# Patient Record
Sex: MALE | Race: BLACK OR AFRICAN AMERICAN | Employment: OTHER | URBAN - METROPOLITAN AREA
[De-identification: names, ages, dates, MRNs, and addresses within clinical notes are randomized per-mention and may not be internally consistent; named-entity substitution may affect disease eponyms.]

---

## 2019-01-02 NOTE — IP AVS SNAPSHOT
[facility]            (For Outpatient Use Only) Initial Admit Date: 2022   Inpt/Obs Admit Date: Inpt: 22 / Obs: N/A   Discharge Date:    Hospital Acct:  [de-identified]   MRN: [de-identified]   CSN: [id number]   CEID: [id number]        ENCOUNTER  Patient Class: Inpatient Admitting Provider: Ronnell Slater MD Unit: [address]//SE   Hospital Service: Ortho/Spine Attending Provider: Sharona Braswell MD   Bed: 456-A   Visit Type:   Referring Physician: No ref. provider found Billing Flag:    Admit Diagnosis: Small bowel obstruction [facility]) [M53.561]      PATIENT  Legal Name:   Radu Brennan    Legal Sex: Male  Gender ID:              Pref Name:    PCP:  Riki Peng MD Home: [phone]   Address:  [facility] : 1940 (81 yrs) Mobile: [phone]         City/State/Zip: [place] [address] Marital:  Language: Jaya AlejandroMinefrain Friday SSN4: [id number] Rastafarian: [address] Not [place]*     Race: Black Or  Ethnicity: Non  Or [address]   Name Relationship Legal Guardian? Home Phone Work Phone Mobile Phone   1. yordan hancock  2.  Jaki Mates Daughter  Spouse      [phone]       GUARANTOR  Guarantor: Kirby Peter : 1940 Home Phone: [phone]   Address: [facility]  Sex: Male Work Phone:    City/State/Zip: [place], [address]   Rel. to Patient: Self Guarantor ID: [id number]   GUARANTOR EMPLOYER   Employer:  Status: RETIRED     COVERAGE  PRIMARY INSURANCE   Payor: BLUE CROSS [facility] Plan: BCBS MEDICARE ADV PPO   Group Number: [id number] Insurance Type: [address] Name: Remberto Wilhelm : 1940   Subscriber ID: [id number] Pt Rel to Subscriber: Self   SECONDARY INSURANCE   Payor:  Plan:    Group Number:  Insurance Type:    Subscriber Name:  Subscriber :    Subscriber ID:  Pt Rel to Subscriber:    TERTIARY INSURANCE   Payor:  Plan:    Group Number:  Insurance Type: Subscriber Name:  Levi Guzman :    Subscriber ID:  Pt Rel to Subscriber:    Hospital Account Financial Class: Medicare Advantage    2022 Consent (Marginal Mandibular)/Introductory Paragraph: The rationale for Mohs was explained to the patient and consent was obtained. The risks, benefits and alternatives to therapy were discussed in detail. Specifically, the risks of damage to the marginal mandibular branch of the facial nerve, infection, scarring, bleeding, prolonged wound healing, incomplete removal, allergy to anesthesia, and recurrence were addressed. Prior to the procedure, the treatment site was clearly identified and confirmed by the patient. All components of Universal Protocol/PAUSE Rule completed.

## 2020-02-29 ENCOUNTER — APPOINTMENT (OUTPATIENT)
Dept: GENERAL RADIOLOGY | Facility: HOSPITAL | Age: 80
DRG: 310 | End: 2020-02-29
Payer: MEDICARE

## 2020-02-29 ENCOUNTER — HOSPITAL ENCOUNTER (INPATIENT)
Facility: HOSPITAL | Age: 80
LOS: 2 days | Discharge: HOME | DRG: 310 | End: 2020-03-02
Attending: EMERGENCY MEDICINE | Admitting: HOSPITALIST
Payer: MEDICARE

## 2020-02-29 ENCOUNTER — APPOINTMENT (OUTPATIENT)
Dept: CT IMAGING | Facility: HOSPITAL | Age: 80
DRG: 310 | End: 2020-02-29
Attending: HOSPITALIST
Payer: MEDICARE

## 2020-02-29 DIAGNOSIS — R00.1 BRADYCARDIA: ICD-10-CM

## 2020-02-29 DIAGNOSIS — R55 SYNCOPE AND COLLAPSE: Primary | ICD-10-CM

## 2020-02-29 LAB
ANION GAP SERPL CALC-SCNC: 4 MMOL/L (ref 0–18)
BASOPHILS # BLD AUTO: 0.04 X10(3) UL (ref 0–0.2)
BASOPHILS NFR BLD AUTO: 0.5 %
BILIRUB UR QL: NEGATIVE
BUN BLD-MCNC: 13 MG/DL (ref 7–18)
BUN/CREAT SERPL: 9.1 (ref 10–20)
CALCIUM BLD-MCNC: 8.6 MG/DL (ref 8.5–10.1)
CHLORIDE SERPL-SCNC: 108 MMOL/L (ref 98–112)
CLARITY UR: CLEAR
CO2 SERPL-SCNC: 28 MMOL/L (ref 21–32)
COLOR UR: YELLOW
CREAT BLD-MCNC: 1.43 MG/DL (ref 0.7–1.3)
DEPRECATED RDW RBC AUTO: 47.9 FL (ref 35.1–46.3)
EOSINOPHIL # BLD AUTO: 0.52 X10(3) UL (ref 0–0.7)
EOSINOPHIL NFR BLD AUTO: 6.4 %
ERYTHROCYTE [DISTWIDTH] IN BLOOD BY AUTOMATED COUNT: 13.2 % (ref 11–15)
GLUCOSE BLD-MCNC: 84 MG/DL (ref 70–99)
GLUCOSE BLDC GLUCOMTR-MCNC: 89 MG/DL (ref 70–99)
GLUCOSE UR-MCNC: NEGATIVE MG/DL
HAV IGM SER QL: 2.2 MG/DL (ref 1.6–2.6)
HCT VFR BLD AUTO: 42.9 % (ref 39–53)
HGB BLD-MCNC: 14 G/DL (ref 13–17.5)
HGB UR QL STRIP.AUTO: NEGATIVE
IMM GRANULOCYTES # BLD AUTO: 0.04 X10(3) UL (ref 0–1)
IMM GRANULOCYTES NFR BLD: 0.5 %
KETONES UR-MCNC: NEGATIVE MG/DL
LEUKOCYTE ESTERASE UR QL STRIP.AUTO: NEGATIVE
LYMPHOCYTES # BLD AUTO: 3.89 X10(3) UL (ref 1–4)
LYMPHOCYTES NFR BLD AUTO: 47.7 %
MCH RBC QN AUTO: 32.2 PG (ref 26–34)
MCHC RBC AUTO-ENTMCNC: 32.6 G/DL (ref 31–37)
MCV RBC AUTO: 98.6 FL (ref 80–100)
MONOCYTES # BLD AUTO: 0.54 X10(3) UL (ref 0.1–1)
MONOCYTES NFR BLD AUTO: 6.6 %
NEUTROPHILS # BLD AUTO: 3.13 X10 (3) UL (ref 1.5–7.7)
NEUTROPHILS # BLD AUTO: 3.13 X10(3) UL (ref 1.5–7.7)
NEUTROPHILS NFR BLD AUTO: 38.3 %
NITRITE UR QL STRIP.AUTO: NEGATIVE
OSMOLALITY SERPL CALC.SUM OF ELEC: 289 MOSM/KG (ref 275–295)
PH UR: 7 [PH] (ref 5–8)
PLATELET # BLD AUTO: 161 10(3)UL (ref 150–450)
POTASSIUM SERPL-SCNC: 4.6 MMOL/L (ref 3.5–5.1)
PROT UR-MCNC: NEGATIVE MG/DL
RBC # BLD AUTO: 4.35 X10(6)UL (ref 3.8–5.8)
SODIUM SERPL-SCNC: 140 MMOL/L (ref 136–145)
SP GR UR STRIP: 1 (ref 1–1.03)
TROPONIN I SERPL-MCNC: <0.045 NG/ML (ref ?–0.04)
TROPONIN I SERPL-MCNC: <0.045 NG/ML (ref ?–0.04)
UROBILINOGEN UR STRIP-ACNC: <2
WBC # BLD AUTO: 8.2 X10(3) UL (ref 4–11)

## 2020-02-29 PROCEDURE — 71045 X-RAY EXAM CHEST 1 VIEW: CPT | Performed by: EMERGENCY MEDICINE

## 2020-02-29 PROCEDURE — 70450 CT HEAD/BRAIN W/O DYE: CPT | Performed by: HOSPITALIST

## 2020-02-29 PROCEDURE — 99223 1ST HOSP IP/OBS HIGH 75: CPT | Performed by: OTHER

## 2020-02-29 PROCEDURE — 99222 1ST HOSP IP/OBS MODERATE 55: CPT | Performed by: HOSPITALIST

## 2020-02-29 RX ORDER — MEMANTINE HYDROCHLORIDE 5 MG/1
5 TABLET ORAL 2 TIMES DAILY
Status: DISCONTINUED | OUTPATIENT
Start: 2020-02-29 | End: 2020-03-02

## 2020-02-29 RX ORDER — BISACODYL 10 MG
10 SUPPOSITORY, RECTAL RECTAL
Status: DISCONTINUED | OUTPATIENT
Start: 2020-02-29 | End: 2020-03-02

## 2020-02-29 RX ORDER — LOSARTAN POTASSIUM 50 MG/1
50 TABLET ORAL 2 TIMES DAILY
COMMUNITY

## 2020-02-29 RX ORDER — ATORVASTATIN CALCIUM 20 MG/1
20 TABLET, FILM COATED ORAL NIGHTLY
Status: DISCONTINUED | OUTPATIENT
Start: 2020-02-29 | End: 2020-03-02

## 2020-02-29 RX ORDER — ATORVASTATIN CALCIUM 20 MG/1
20 TABLET, FILM COATED ORAL NIGHTLY
COMMUNITY

## 2020-02-29 RX ORDER — MEMANTINE HYDROCHLORIDE 5 MG/1
5 TABLET ORAL 2 TIMES DAILY
COMMUNITY

## 2020-02-29 RX ORDER — HYDROCODONE BITARTRATE AND ACETAMINOPHEN 5; 325 MG/1; MG/1
1 TABLET ORAL EVERY 4 HOURS PRN
Status: DISCONTINUED | OUTPATIENT
Start: 2020-02-29 | End: 2020-03-02

## 2020-02-29 RX ORDER — TAMSULOSIN HYDROCHLORIDE 0.4 MG/1
0.4 CAPSULE ORAL 2 TIMES DAILY
COMMUNITY

## 2020-02-29 RX ORDER — DOCUSATE SODIUM 100 MG/1
100 CAPSULE, LIQUID FILLED ORAL 2 TIMES DAILY
Status: DISCONTINUED | OUTPATIENT
Start: 2020-02-29 | End: 2020-03-02

## 2020-02-29 RX ORDER — ONDANSETRON 2 MG/ML
4 INJECTION INTRAMUSCULAR; INTRAVENOUS EVERY 6 HOURS PRN
Status: DISCONTINUED | OUTPATIENT
Start: 2020-02-29 | End: 2020-03-02

## 2020-02-29 RX ORDER — HALOPERIDOL 2 MG/ML
1 SOLUTION ORAL EVERY 6 HOURS PRN
Status: DISCONTINUED | OUTPATIENT
Start: 2020-02-29 | End: 2020-02-29

## 2020-02-29 RX ORDER — FINASTERIDE 5 MG/1
5 TABLET, FILM COATED ORAL DAILY
COMMUNITY

## 2020-02-29 RX ORDER — AMLODIPINE BESYLATE 5 MG/1
5 TABLET ORAL DAILY
COMMUNITY

## 2020-02-29 RX ORDER — METOCLOPRAMIDE HYDROCHLORIDE 5 MG/ML
10 INJECTION INTRAMUSCULAR; INTRAVENOUS EVERY 8 HOURS PRN
Status: DISCONTINUED | OUTPATIENT
Start: 2020-02-29 | End: 2020-03-02

## 2020-02-29 RX ORDER — ASPIRIN 81 MG/1
81 TABLET ORAL DAILY
Status: DISCONTINUED | OUTPATIENT
Start: 2020-02-29 | End: 2020-03-02

## 2020-02-29 RX ORDER — HALOPERIDOL 5 MG/ML
1 INJECTION INTRAMUSCULAR EVERY 6 HOURS PRN
Status: DISCONTINUED | OUTPATIENT
Start: 2020-02-29 | End: 2020-03-02

## 2020-02-29 RX ORDER — FINASTERIDE 5 MG/1
5 TABLET, FILM COATED ORAL DAILY
Status: DISCONTINUED | OUTPATIENT
Start: 2020-02-29 | End: 2020-03-02

## 2020-02-29 RX ORDER — POLYETHYLENE GLYCOL 3350 17 G/17G
17 POWDER, FOR SOLUTION ORAL DAILY PRN
Status: DISCONTINUED | OUTPATIENT
Start: 2020-02-29 | End: 2020-03-02

## 2020-02-29 RX ORDER — ACETAMINOPHEN 325 MG/1
650 TABLET ORAL EVERY 4 HOURS PRN
Status: DISCONTINUED | OUTPATIENT
Start: 2020-02-29 | End: 2020-03-02

## 2020-02-29 RX ORDER — GABAPENTIN 300 MG/1
300 CAPSULE ORAL 2 TIMES DAILY
COMMUNITY

## 2020-02-29 RX ORDER — TAMSULOSIN HYDROCHLORIDE 0.4 MG/1
0.4 CAPSULE ORAL 2 TIMES DAILY
Status: DISCONTINUED | OUTPATIENT
Start: 2020-02-29 | End: 2020-03-02

## 2020-02-29 RX ORDER — HYDROCODONE BITARTRATE AND ACETAMINOPHEN 5; 325 MG/1; MG/1
2 TABLET ORAL EVERY 4 HOURS PRN
Status: DISCONTINUED | OUTPATIENT
Start: 2020-02-29 | End: 2020-03-02

## 2020-02-29 RX ORDER — MELATONIN
1000 DAILY
COMMUNITY

## 2020-02-29 RX ORDER — SODIUM CHLORIDE 0.9 % (FLUSH) 0.9 %
3 SYRINGE (ML) INJECTION AS NEEDED
Status: DISCONTINUED | OUTPATIENT
Start: 2020-02-29 | End: 2020-03-02

## 2020-02-29 NOTE — ED INITIAL ASSESSMENT (HPI)
Wife reports pt had episode where pt would not respond to her, was lowered to ground by bystanders. An AED was applied without shock advised. Bystander CPR performed for a few seconds when pt became responsive. Pt awake, alert without complaint.   Pt denies

## 2020-02-29 NOTE — CONSULTS
Neurology Inpatient Consult Note    Indy Islas : 1940   Referring Physician: Dr. Lizette Ramirez  HPI:     Indy Islas is a 67year old male who is being seen in neurologic evaluation.     Patient presented this visit with episode of loss of conscio round, and reactive to light; extraocular movements intact; facial sensation intact V1-3, face symmetric, hearing impaired, no dysarthria or dysphonia  Motor: 5/5 strength proximally and distally; normal bulk and tone; no drift  Sensory: intact to light to

## 2020-02-29 NOTE — CONSULTS
[facility] HOSP - [facility]    Report of Cardiology Consultation    Ashvin Roque Patient Status:  Inpatient    1940 MRN [id number]   Location [facility] 3W/SW Attending Gama Thompson MD   Hosp Day # 0 PCP Jodi Borjas MD     Date of Admi Father    • Diabetes Sister    • Hypertension Sister        Social History  Patient Guardians:  Not on file    Other Topics            Concern    None on file    Social History Narrative    None on file            Current Medications:  aspirin EC tab 81 mg pressure 131/68, pulse 55, temperature 97.9 °F (36.6 °C), temperature source Oral, resp. rate 16, height 5' 6\" (1.676 m), weight 141 lb (64 kg), SpO2 99 %.     Scheduled Meds:   • aspirin  81 mg Oral Daily   • atorvastatin  20 mg Oral Nightly   • finasteri

## 2020-02-29 NOTE — H&P
[facility] Patient Status:  Inpatient    1940 MRN [id number]   Location [facility] 3W/SW Attending Brenda Crisostomo MD   Hosp Day # 0 PCP Reinaldo Stone MD     Date:  8961  Date of A (FLOMAX) cap, Take 0.4 mg by mouth 2 (two) times daily. Memantine HCl (NAMENDA) 5 MG Oral Tab, Take 5 mg by mouth 2 (two) times daily. finasteride 5 MG Oral Tab, Take 5 mg by mouth daily.   gabapentin 300 MG Oral Cap, Take 300 mg by mouth 2 (two) times da Franca Jackson MD on 2/29/2020 at 12:15     Approved by (CST): Franca Jackson MD on 2/29/2020 at 12:16          Ekg 12-lead    Result Date: 2/29/2020  ECG Report  Interpretation  --------------------------     Ekg 12-lead    Result Date: 2/29/2020  ECG Rep

## 2020-03-01 LAB
ANION GAP SERPL CALC-SCNC: 6 MMOL/L (ref 0–18)
BASOPHILS # BLD AUTO: 0.03 X10(3) UL (ref 0–0.2)
BASOPHILS NFR BLD AUTO: 0.4 %
BUN BLD-MCNC: 14 MG/DL (ref 7–18)
BUN/CREAT SERPL: 11.4 (ref 10–20)
CALCIUM BLD-MCNC: 8.9 MG/DL (ref 8.5–10.1)
CHLORIDE SERPL-SCNC: 108 MMOL/L (ref 98–112)
CHOLEST SMN-MCNC: 150 MG/DL (ref ?–200)
CO2 SERPL-SCNC: 27 MMOL/L (ref 21–32)
CREAT BLD-MCNC: 1.23 MG/DL (ref 0.7–1.3)
DEPRECATED RDW RBC AUTO: 46.5 FL (ref 35.1–46.3)
EOSINOPHIL # BLD AUTO: 0.45 X10(3) UL (ref 0–0.7)
EOSINOPHIL NFR BLD AUTO: 6 %
ERYTHROCYTE [DISTWIDTH] IN BLOOD BY AUTOMATED COUNT: 13.2 % (ref 11–15)
GLUCOSE BLD-MCNC: 91 MG/DL (ref 70–99)
GLUCOSE BLDC GLUCOMTR-MCNC: 125 MG/DL (ref 70–99)
HAV IGM SER QL: 2.1 MG/DL (ref 1.6–2.6)
HCT VFR BLD AUTO: 40.3 % (ref 39–53)
HDLC SERPL-MCNC: 66 MG/DL (ref 40–59)
HGB BLD-MCNC: 13.5 G/DL (ref 13–17.5)
IMM GRANULOCYTES # BLD AUTO: 0.02 X10(3) UL (ref 0–1)
IMM GRANULOCYTES NFR BLD: 0.3 %
LDLC SERPL CALC-MCNC: 73 MG/DL (ref ?–100)
LYMPHOCYTES # BLD AUTO: 2.37 X10(3) UL (ref 1–4)
LYMPHOCYTES NFR BLD AUTO: 31.6 %
MCH RBC QN AUTO: 32.3 PG (ref 26–34)
MCHC RBC AUTO-ENTMCNC: 33.5 G/DL (ref 31–37)
MCV RBC AUTO: 96.4 FL (ref 80–100)
MONOCYTES # BLD AUTO: 0.51 X10(3) UL (ref 0.1–1)
MONOCYTES NFR BLD AUTO: 6.8 %
NEUTROPHILS # BLD AUTO: 4.11 X10 (3) UL (ref 1.5–7.7)
NEUTROPHILS # BLD AUTO: 4.11 X10(3) UL (ref 1.5–7.7)
NEUTROPHILS NFR BLD AUTO: 54.9 %
NONHDLC SERPL-MCNC: 84 MG/DL (ref ?–130)
OSMOLALITY SERPL CALC.SUM OF ELEC: 292 MOSM/KG (ref 275–295)
PLATELET # BLD AUTO: 185 10(3)UL (ref 150–450)
POTASSIUM SERPL-SCNC: 4 MMOL/L (ref 3.5–5.1)
RBC # BLD AUTO: 4.18 X10(6)UL (ref 3.8–5.8)
SODIUM SERPL-SCNC: 141 MMOL/L (ref 136–145)
TRIGL SERPL-MCNC: 57 MG/DL (ref 30–149)
VLDLC SERPL CALC-MCNC: 11 MG/DL (ref 0–30)
WBC # BLD AUTO: 7.5 X10(3) UL (ref 4–11)

## 2020-03-01 PROCEDURE — 99233 SBSQ HOSP IP/OBS HIGH 50: CPT | Performed by: OTHER

## 2020-03-01 PROCEDURE — 99233 SBSQ HOSP IP/OBS HIGH 50: CPT | Performed by: HOSPITALIST

## 2020-03-01 RX ORDER — AMLODIPINE BESYLATE 5 MG/1
5 TABLET ORAL DAILY
Status: DISCONTINUED | OUTPATIENT
Start: 2020-03-01 | End: 2020-03-02

## 2020-03-01 RX ORDER — LOSARTAN POTASSIUM 25 MG/1
25 TABLET ORAL DAILY
Status: DISCONTINUED | OUTPATIENT
Start: 2020-03-01 | End: 2020-03-02

## 2020-03-01 NOTE — PROGRESS NOTES
[facility]    Progress Note    Shana Meter Patient Status:  Inpatient    1940 MRN [id number]   Location [facility] 3W/SW Attending Koki Martin MD   Hosp Day # 1 PCP Sun Carrasco MD        Subjective:     Respiratory 27.0 03/01/2020    GLU 91 03/01/2020    CA 8.9 03/01/2020    MG 2.1 03/01/2020    TROP <0.045 02/29/2020       Ct Brain Or Head (78357)    Result Date: 2/29/2020  CONCLUSION:  1. No acute intracranial process by noncontrast CT technique.   2. Senescent alejandre

## 2020-03-01 NOTE — PROGRESS NOTES
[facility]    Progress Note    [facility] Patient Status:  Inpatient    1940 MRN [id number]   Location [facility] 3W/SW Attending Darian Huffman MD   Hosp Day # 1 PCP Maria Eugenia Christine MD       Subjective:   Raúl Ocampo is loss with sequela of chronic microvascular ischemic disease, including chronic lacunar infarcts. 3. There is also large vessel atherosclerosis. 4. Mild paranasal sinus disease. 5. Lesser incidental findings as above.       Dictated by (CST): Debora Katz

## 2020-03-01 NOTE — PROGRESS NOTES
[facility]    Progress Note    Billie Tamez Patient Status:  Inpatient    1940 MRN [id number]   Location [facility] 3W/SW Attending Gracie Clement MD   Hosp Day # 1 PCP Jose León MD       Subjective:   Billie Tamez is injection 10 mg, 10 mg, Intravenous, Q8H PRN  glycerin-Hypromellose- (ARTIFICAL TEARS) 0.2-0.2-1 % ophthalmic solution 1 drop, 1 drop, Both Eyes, QID PRN  haloperidol lactate (HALDOL) 5 MG/ML injection 1 mg, 1 mg, Intravenous, Q6H PRN        Review 2/29/2020 at 12:16          Ekg 12-lead    Result Date: 2/29/2020  ECG Report  Interpretation  --------------------------     Ekg 12-lead    Result Date: 2/29/2020  ECG Report  Interpretation  --------------------------         Shobha Rose MD, MD  3/1/2

## 2020-03-01 NOTE — PLAN OF CARE
Alert and oriented, patient very agitated tonight, climbing out of bed, stating \"I am going home. I am a grown man and there is no reason for me to be here. \" This RN, Juan, and pod nurse have explained multiple times condition of patient and that it is pt frequently for physical needs  - Identify cognitive and physical deficits and behaviors that affect risk of falls.   - [place] fall precautions as indicated by assessment.  - Educate pt/family on patient safety including physical limitations  - Instruc NEUROLOGICAL - ADULT  Goal: Achieves stable or improved neurological status  Description  INTERVENTIONS  - Assess for and report changes in neurological status  - Initiate measures to prevent increased intracranial pressure  - Maintain blood pressure and f

## 2020-03-01 NOTE — PLAN OF CARE
Problem: SAFETY ADULT - FALL  Goal: Free from fall injury  Description  INTERVENTIONS:  - Assess pt frequently for physical needs  - Identify cognitive and physical deficits and behaviors that affect risk of falls.   - [place] fall precautions as indica and/or skin breakdown  - Initiate interventions, skin care algorithm/standards of care as needed  Outcome: Progressing     Problem: NEUROLOGICAL - ADULT  Goal: Achieves stable or improved neurological status  Description  INTERVENTIONS  - Assess for and re comfortable/denies dizziness/denies pain  VSS.   Up in room with standby assist  Significant other @ bedside

## 2020-03-01 NOTE — PLAN OF CARE
Pt resting in chair, ambulates with assistance. EEG, MRI, and 2d echo ordered. SB. Per night shift RN, pt was given haldol last night for agitation and anxiety. No complaints at this time.      Problem: Patient/Family Goals  Goal: Patient/Family Long Term G Problem: CARDIOVASCULAR - ADULT  Goal: Maintains optimal cardiac output and hemodynamic stability  Description  INTERVENTIONS:  - Monitor vital signs, rhythm, and trends  - Monitor for bleeding, hypotension and signs of decreased cardiac output  - Evalua for seizure activity  - Administer anti-seizure medications as ordered  - Monitor neurological status  Outcome: Progressing  Goal: Remains free of injury related to seizure activity  Description  INTERVENTIONS:  - Maintain airway, patient safety  and admin

## 2020-03-02 ENCOUNTER — APPOINTMENT (OUTPATIENT)
Dept: CV DIAGNOSTICS | Facility: HOSPITAL | Age: 80
DRG: 310 | End: 2020-03-02
Attending: HOSPITALIST
Payer: MEDICARE

## 2020-03-02 VITALS
RESPIRATION RATE: 16 BRPM | WEIGHT: 136.69 LBS | BODY MASS INDEX: 21.97 KG/M2 | HEIGHT: 66 IN | DIASTOLIC BLOOD PRESSURE: 70 MMHG | HEART RATE: 69 BPM | SYSTOLIC BLOOD PRESSURE: 128 MMHG | TEMPERATURE: 98 F | OXYGEN SATURATION: 98 %

## 2020-03-02 LAB
ANION GAP SERPL CALC-SCNC: 5 MMOL/L (ref 0–18)
BASOPHILS # BLD AUTO: 0.03 X10(3) UL (ref 0–0.2)
BASOPHILS NFR BLD AUTO: 0.5 %
BUN BLD-MCNC: 11 MG/DL (ref 7–18)
BUN/CREAT SERPL: 10 (ref 10–20)
CALCIUM BLD-MCNC: 9 MG/DL (ref 8.5–10.1)
CHLORIDE SERPL-SCNC: 108 MMOL/L (ref 98–112)
CO2 SERPL-SCNC: 28 MMOL/L (ref 21–32)
CREAT BLD-MCNC: 1.1 MG/DL (ref 0.7–1.3)
DEPRECATED RDW RBC AUTO: 46.2 FL (ref 35.1–46.3)
EOSINOPHIL # BLD AUTO: 0.4 X10(3) UL (ref 0–0.7)
EOSINOPHIL NFR BLD AUTO: 7.1 %
ERYTHROCYTE [DISTWIDTH] IN BLOOD BY AUTOMATED COUNT: 13.2 % (ref 11–15)
GLUCOSE BLD-MCNC: 87 MG/DL (ref 70–99)
HAV IGM SER QL: 2.1 MG/DL (ref 1.6–2.6)
HCT VFR BLD AUTO: 40.1 % (ref 39–53)
HGB BLD-MCNC: 13.6 G/DL (ref 13–17.5)
IMM GRANULOCYTES # BLD AUTO: 0.03 X10(3) UL (ref 0–1)
IMM GRANULOCYTES NFR BLD: 0.5 %
LYMPHOCYTES # BLD AUTO: 1.93 X10(3) UL (ref 1–4)
LYMPHOCYTES NFR BLD AUTO: 34.4 %
MCH RBC QN AUTO: 32.2 PG (ref 26–34)
MCHC RBC AUTO-ENTMCNC: 33.9 G/DL (ref 31–37)
MCV RBC AUTO: 95 FL (ref 80–100)
MONOCYTES # BLD AUTO: 0.35 X10(3) UL (ref 0.1–1)
MONOCYTES NFR BLD AUTO: 6.2 %
NEUTROPHILS # BLD AUTO: 2.87 X10 (3) UL (ref 1.5–7.7)
NEUTROPHILS # BLD AUTO: 2.87 X10(3) UL (ref 1.5–7.7)
NEUTROPHILS NFR BLD AUTO: 51.3 %
OSMOLALITY SERPL CALC.SUM OF ELEC: 291 MOSM/KG (ref 275–295)
PLATELET # BLD AUTO: 171 10(3)UL (ref 150–450)
POTASSIUM SERPL-SCNC: 4.3 MMOL/L (ref 3.5–5.1)
RBC # BLD AUTO: 4.22 X10(6)UL (ref 3.8–5.8)
SODIUM SERPL-SCNC: 141 MMOL/L (ref 136–145)
WBC # BLD AUTO: 5.6 X10(3) UL (ref 4–11)

## 2020-03-02 PROCEDURE — 99239 HOSP IP/OBS DSCHRG MGMT >30: CPT | Performed by: HOSPITALIST

## 2020-03-02 PROCEDURE — 93306 TTE W/DOPPLER COMPLETE: CPT | Performed by: HOSPITALIST

## 2020-03-02 PROCEDURE — 95816 EEG AWAKE AND DROWSY: CPT | Performed by: OTHER

## 2020-03-02 PROCEDURE — 99233 SBSQ HOSP IP/OBS HIGH 50: CPT | Performed by: OTHER

## 2020-03-02 NOTE — PROGRESS NOTES
[facility]    Cardiology Progress Note    Karlos Olmos Patient Status:  Inpatient    1940 MRN [id number]   Location [facility] 3W/SW Attending No att. providers found   Hosp Day # 2 PCP Nathan Mera MD         [facility]essment [facility]  Cardiac Electrophysiology  3/2/2020

## 2020-03-02 NOTE — PLAN OF CARE
Problem: Patient/Family Goals  Goal: Patient/Family Long Term Goal  Description  Patient's Long Term Goal: \"To be healthy\"    Interventions:  - Medication compliance  - MD Follow up  - See additional Care Plan goals for specific interventions   Outcome bleeding, hypotension and signs of decreased cardiac output  - Evaluate effectiveness of vasoactive medications to optimize hemodynamic stability  - Monitor arterial and/or venous puncture sites for bleeding and/or hematoma  - Assess quality of pulses, ski activity  Description  INTERVENTIONS:  - Maintain airway, patient safety  and administer oxygen as ordered  - Monitor patient for seizure activity, document and report duration and description of seizure to MD/LIP  - If seizure occurs, turn patient to side

## 2020-03-02 NOTE — PAYOR COMM NOTE
--------------  ADMISSION REVIEW     Payor: BCBS MEDICARE ADV PPO  Subscriber #:  [id number]  Authorization Number: [id number]    Admit date: 2/29/20  Admit time: [address]        Admitting Physician: Ronold Castleman, MD  Attending Physician:  Ronold Castleman, Eyes: Negative. Respiratory: Negative. Cardiovascular: Negative. Gastrointestinal: Negative. Genitourinary: Negative. Musculoskeletal: Negative. Skin: Negative. Neurological: Negative. All other systems reviewed and are negative. General: No deformity. Right lower leg: No edema. Left lower leg: No edema. Skin:     General: Skin is warm and dry. Neurological:      General: No focal deficit present.       Mental Status: He is alert and oriented to person, place, and t CXR unremarkable. CBC and BMP are unremarkable. Zero and two hour troponin are within normal limits. Zero and two hour EKGs are without acute ischemic changes. Cardiology is consulted. Patient is admitted for his syncopal episode.     Imaging:   Ct Brain PROCEDURE: CT BRAIN OR HEAD (CPT=70450)  COMPARISON: None available. INDICATIONS: Transient alteration of awareness. Acute syncopal episode.   TECHNIQUE: CT images were obtained without contrast material. Automated exposure control for dose reduction was u arteries. Extensive dental amalgam is seen on the  view. CONCLUSION:  1. No acute intracranial process by noncontrast CT technique.   2. Senescent changes of parenchymal volume loss with sequela of chronic microvascular ischemic disease, inclu We recommend that you schedule follow up care with a primary care provider within the next three months to obtain basic health screening including reassessment of your blood pressure.     Medications Prescribed:  Current Discharge Medication List Past Medical History:   Diagnosis Date   • Dementia [facility])    • Enlarged prostate    • Essential hypertension    • Hyperlipidemia      History reviewed. No pertinent surgical history.   Family History   Problem Relation Age of Onset   • Cancer Father    • Gloriaerki Couch ABDOMEN: Soft and non-tender. Bowel sounds were present. MUSCULOSKELETAL:  There was no deformity. There was full range of motion in all the extremities. EXTREMITIES: There was no edema, clubbing or cyanosis  NEUROLOGICAL:  There was no focal deficit. Consults signed by Rut Cortez MD at 2/29/2020  4:40 PM     Author: Rut Cortez MD Service: Neurology Author Type: Physician   Filed: 2/29/2020  4:40 PM Date of Service: 2/29/2020  4:35 PM Status: Signed   : Rut Cortez MD (Physician)     Ne CV: regular rate and rhythm   Lungs: clear to auscultation bilaterally  Neuro:  Mental Status: alert, speech fluent, follows two-step commands, oriented to self and to hospital but does not know which floor he is on and does not know what year it is     Cr Author: Lily Ortega MD Service: Cardiology Author Type: Physician   Filed: 2/29/2020  4:19 PM Date of Service: 2/29/2020  4:13 PM Status: Addendum   : Lily Ortega MD (Physician)   Related Notes: Original Note by Lily Ortega MD (Physician) filed at The patient denies chest pain, shortness of breath, MATHUR, PND, orthopnea, or palpitations.     Tele mild sinus bradycardia  ekg sinus bradycardia first deg av delay  Creat 1.4  Trop neg x 2  cxr normal  hct neg        Past Medical History losartan Potassium 50 MG Oral Tab, Take 50 mg by mouth 2 (two) times daily. aspirin 81 MG Oral Tab, Take 81 mg by mouth daily. tamsulosin (FLOMAX) cap, Take 0.4 mg by mouth 2 (two) times daily.   Memantine HCl (NAMENDA) 5 MG Oral Tab, Take 5 mg by mouth 2 Thank you for allowing me to participate in the care of your patient.     Evan Mercado, [address] [facility]  Cardiac Electrophysiology  2/29/2020        3/1  Jesus Mcneill MD   Physician   Hospitalist   Progress Notes   Addendum   Date of Se    03/01/2020     K 4.0 03/01/2020      03/01/2020     CO2 27.0 03/01/2020     GLU 91 03/01/2020     CA 8.9 03/01/2020     MG 2.1 03/01/2020     TROP <0.045 02/29/2020         Ct Brain Or Head (06146)     Result Date: 2/29/2020  CONCLUSION:  1. Date Action Dose Route User    3/2/2020 8668 Given 81 mg Oral Rose Ellis RN      atorvastatin (LIPITOR) tab 20 mg     Date Action Dose Route User    3/1/2020 2006 Given 20 mg Oral Kurtis Diehl RN      docusate sodium (COLACE) cap 100 mg     Da

## 2020-03-02 NOTE — CM/SW NOTE
MARICARMEN self referred pt for d/c planning. MARICARMEN met w/ pt and pt's wife in his room. Pt unable to verify his address. Pt's wife was present and verified the address - his wife confirmed that they have 3 grandchildren living w/ them.  Pt's wife also informed SW

## 2020-03-02 NOTE — PLAN OF CARE
Problem: Patient/Family Goals  Goal: Patient/Family Long Term Goal  Description  Patient's Long Term Goal: \"To be healthy\"    Interventions:  - Medication compliance  - MD Follow up  -pt/ot  - See additional Care Plan goals for specific interventions and trends  - Monitor for bleeding, hypotension and signs of decreased cardiac output  - Evaluate effectiveness of vasoactive medications to optimize hemodynamic stability  - Monitor arterial and/or venous puncture sites for bleeding and/or hematoma  - Ass rails  - Instruct patient/family to notify RN of any seizure activity  - Instruct patient/family to call for assistance with activity based on assessment  Outcome: Progressing  Goal: Achieves maximal functionality and self care  Description  INTERVENTIONS

## 2020-03-02 NOTE — PROGRESS NOTES
[facility]    Progress Note    River aLyne Patient Status:  Inpatient    1940 MRN [id number]   Location [facility] 3W/SW Attending Gwen Gomez MD   Hosp Day # 2 PCP Naty Bentley MD       Subjective:   River [place] is Intravenous, Q6H PRN  Metoclopramide HCl (REGLAN) injection 10 mg, 10 mg, Intravenous, Q8H PRN  glycerin-Hypromellose- (ARTIFICAL TEARS) 0.2-0.2-1 % ophthalmic solution 1 drop, 1 drop, Both Eyes, QID PRN  haloperidol lactate (HALDOL) 5 MG/ML injecti

## 2020-03-02 NOTE — DISCHARGE SUMMARY
[facility]    Discharge Summary    Carlos Aceves Patient Status:  Inpatient    1940 MRN [id number]   Location [facility] 3W/SW Attending Yris Merino MD   Hosp Day # 2 PCP Myesha Roland MD     Date of Admission: 20 Course:     Syncope and collapse  -has bradycardia on tele  -cont to monitor on tele  -ekg reviewed  -ct head --> negative  -tesha cardiology and neurology eval  -echocardiogram--> ef 62%  -MRI cancelled due hx.  Of retained bullety  -EEG negative  -possible

## 2020-03-02 NOTE — PROCEDURES
[address]  [facility], [address]      PATIENT'S NAME: Liliana Aries PHYSICIAN: Tiffanie Black MD   PATIENT ACCOUNT #: [de-identified] LOCATION: [address] [place] [address] RECORD #: [id number] DATE OF BIRTH: [birth year]

## 2020-03-03 NOTE — PAYOR COMM NOTE
--------------  DISCHARGE REVIEW    Payor: BCBS MEDICARE ADV PPO  Subscriber #:  [id number]  Authorization Number: [id number]    Admit date: 2/29/20  Admit time:  2271  Discharge Date: 3/2/2020  4:00 PM     Admitting Physician: MD Ranjith Cao prostate, dementia. He was with his wife at his grandsons track meet. His wife noted that he was shaking his leg she looked at him and noted that his eyes were rolled back and he was unresponsive for less than a minute.   There was a nurse in the scans, C Tabs  Generic drug:  Memantine HCl      Take 5 mg by mouth 2 (two) times daily. Refills:  0     tamsulosin HCl 0.4 MG Caps  Commonly known as:  FLOMAX      Take 0.4 mg by mouth 2 (two) times daily.    Refills:  0     Vitamin B-12 1000 MCG Tabs  Commonly k

## 2022-05-18 ENCOUNTER — HOSPITAL ENCOUNTER (INPATIENT)
Facility: HOSPITAL | Age: 82
LOS: 26 days | Discharge: NURSING, REHAB OR LONG-TERM CARE | End: 2022-06-14
Attending: EMERGENCY MEDICINE | Admitting: HOSPITALIST
Payer: MEDICARE

## 2022-05-18 ENCOUNTER — APPOINTMENT (OUTPATIENT)
Dept: GENERAL RADIOLOGY | Facility: HOSPITAL | Age: 82
End: 2022-05-18
Attending: EMERGENCY MEDICINE
Payer: MEDICARE

## 2022-05-18 DIAGNOSIS — K56.609 SMALL BOWEL OBSTRUCTION (HCC): ICD-10-CM

## 2022-05-18 DIAGNOSIS — R53.1 WEAKNESS GENERALIZED: Primary | ICD-10-CM

## 2022-05-18 DIAGNOSIS — N28.9 RENAL INSUFFICIENCY: ICD-10-CM

## 2022-05-18 DIAGNOSIS — K55.9 SMALL BOWEL ISCHEMIA (HCC): ICD-10-CM

## 2022-05-18 DIAGNOSIS — D72.829 LEUKOCYTOSIS, UNSPECIFIED TYPE: ICD-10-CM

## 2022-05-18 LAB
BASOPHILS # BLD AUTO: 0.03 X10(3) UL (ref 0–0.2)
BASOPHILS NFR BLD AUTO: 0.1 %
DEPRECATED RDW RBC AUTO: 48.5 FL (ref 35.1–46.3)
EOSINOPHIL # BLD AUTO: 0 X10(3) UL (ref 0–0.7)
EOSINOPHIL NFR BLD AUTO: 0 %
ERYTHROCYTE [DISTWIDTH] IN BLOOD BY AUTOMATED COUNT: 13.6 % (ref 11–15)
HCT VFR BLD AUTO: 45.5 %
HGB BLD-MCNC: 15.1 G/DL
IMM GRANULOCYTES # BLD AUTO: 0.15 X10(3) UL (ref 0–1)
IMM GRANULOCYTES NFR BLD: 0.7 %
LYMPHOCYTES # BLD AUTO: 0.59 X10(3) UL (ref 1–4)
LYMPHOCYTES NFR BLD AUTO: 2.8 %
MCH RBC QN AUTO: 31.7 PG (ref 26–34)
MCHC RBC AUTO-ENTMCNC: 33.2 G/DL (ref 31–37)
MCV RBC AUTO: 95.4 FL
MONOCYTES # BLD AUTO: 1.26 X10(3) UL (ref 0.1–1)
MONOCYTES NFR BLD AUTO: 5.9 %
NEUTROPHILS # BLD AUTO: 19.19 X10 (3) UL (ref 1.5–7.7)
NEUTROPHILS # BLD AUTO: 19.19 X10(3) UL (ref 1.5–7.7)
NEUTROPHILS NFR BLD AUTO: 90.5 %
PLATELET # BLD AUTO: 232 10(3)UL (ref 150–450)
RBC # BLD AUTO: 4.77 X10(6)UL
WBC # BLD AUTO: 21.2 X10(3) UL (ref 4–11)

## 2022-05-18 PROCEDURE — 71045 X-RAY EXAM CHEST 1 VIEW: CPT | Performed by: EMERGENCY MEDICINE

## 2022-05-19 ENCOUNTER — APPOINTMENT (OUTPATIENT)
Dept: GENERAL RADIOLOGY | Facility: HOSPITAL | Age: 82
End: 2022-05-19
Attending: SURGERY
Payer: MEDICARE

## 2022-05-19 ENCOUNTER — APPOINTMENT (OUTPATIENT)
Dept: GENERAL RADIOLOGY | Facility: HOSPITAL | Age: 82
End: 2022-05-19
Attending: EMERGENCY MEDICINE
Payer: MEDICARE

## 2022-05-19 ENCOUNTER — APPOINTMENT (OUTPATIENT)
Dept: GENERAL RADIOLOGY | Facility: HOSPITAL | Age: 82
End: 2022-05-19
Attending: CLINICAL NURSE SPECIALIST
Payer: MEDICARE

## 2022-05-19 ENCOUNTER — APPOINTMENT (OUTPATIENT)
Dept: CT IMAGING | Facility: HOSPITAL | Age: 82
End: 2022-05-19
Attending: EMERGENCY MEDICINE
Payer: MEDICARE

## 2022-05-19 ENCOUNTER — APPOINTMENT (OUTPATIENT)
Dept: CV DIAGNOSTICS | Facility: HOSPITAL | Age: 82
End: 2022-05-19
Attending: HOSPITALIST
Payer: MEDICARE

## 2022-05-19 ENCOUNTER — APPOINTMENT (OUTPATIENT)
Dept: PICC SERVICES | Facility: HOSPITAL | Age: 82
End: 2022-05-19
Attending: SURGERY
Payer: MEDICARE

## 2022-05-19 ENCOUNTER — ANESTHESIA EVENT (OUTPATIENT)
Dept: SURGERY | Facility: HOSPITAL | Age: 82
End: 2022-05-19
Payer: MEDICARE

## 2022-05-19 ENCOUNTER — ANESTHESIA (OUTPATIENT)
Dept: SURGERY | Facility: HOSPITAL | Age: 82
End: 2022-05-19
Payer: MEDICARE

## 2022-05-19 PROBLEM — K55.9 SMALL BOWEL ISCHEMIA (HCC): Status: ACTIVE | Noted: 2022-05-19

## 2022-05-19 PROBLEM — K56.609 SMALL BOWEL OBSTRUCTION (HCC): Status: ACTIVE | Noted: 2022-05-19

## 2022-05-19 PROBLEM — N28.9 RENAL INSUFFICIENCY: Status: ACTIVE | Noted: 2022-05-19

## 2022-05-19 PROBLEM — D72.829 LEUKOCYTOSIS, UNSPECIFIED TYPE: Status: ACTIVE | Noted: 2022-05-19

## 2022-05-19 LAB
ALBUMIN SERPL-MCNC: 2.4 G/DL (ref 3.4–5)
ALBUMIN SERPL-MCNC: 3.6 G/DL (ref 3.4–5)
ALBUMIN SERPL-MCNC: 3.8 G/DL (ref 3.4–5)
ALBUMIN/GLOB SERPL: 0.7 {RATIO} (ref 1–2)
ALBUMIN/GLOB SERPL: 1 {RATIO} (ref 1–2)
ALP LIVER SERPL-CCNC: 39 U/L
ALP LIVER SERPL-CCNC: 41 U/L
ALP LIVER SERPL-CCNC: 42 U/L
ALT SERPL-CCNC: 33 U/L
ALT SERPL-CCNC: 33 U/L
ALT SERPL-CCNC: 74 U/L
ANION GAP SERPL CALC-SCNC: 10 MMOL/L (ref 0–18)
ANION GAP SERPL CALC-SCNC: 12 MMOL/L (ref 0–18)
ANION GAP SERPL CALC-SCNC: 8 MMOL/L (ref 0–18)
ANION GAP SERPL CALC-SCNC: 9 MMOL/L (ref 0–18)
ANTIBODY SCREEN: NEGATIVE
AST SERPL-CCNC: 27 U/L (ref 15–37)
AST SERPL-CCNC: 28 U/L (ref 15–37)
AST SERPL-CCNC: 80 U/L (ref 15–37)
BASE EXCESS BLD CALC-SCNC: -4.7 MMOL/L (ref ?–2)
BASE EXCESS BLD CALC-SCNC: -8 MMOL/L (ref ?–2)
BASOPHILS # BLD AUTO: 0.07 X10(3) UL (ref 0–0.2)
BASOPHILS # BLD: 0.08 X10(3) UL (ref 0–0.2)
BASOPHILS NFR BLD AUTO: 0.3 %
BASOPHILS NFR BLD: 1 %
BILIRUB DIRECT SERPL-MCNC: 0.8 MG/DL (ref 0–0.2)
BILIRUB SERPL-MCNC: 3.9 MG/DL (ref 0.1–2)
BILIRUB SERPL-MCNC: 4.3 MG/DL (ref 0.1–2)
BILIRUB SERPL-MCNC: 4.6 MG/DL (ref 0.1–2)
BILIRUB UR QL CFM: NEGATIVE
BUN BLD-MCNC: 23 MG/DL (ref 7–18)
BUN BLD-MCNC: 24 MG/DL (ref 7–18)
BUN BLD-MCNC: 27 MG/DL (ref 7–18)
BUN BLD-MCNC: 28 MG/DL (ref 7–18)
BUN/CREAT SERPL: 10.7 (ref 10–20)
BUN/CREAT SERPL: 14.1 (ref 10–20)
BUN/CREAT SERPL: 14.8 (ref 10–20)
BUN/CREAT SERPL: 18.3 (ref 10–20)
CA-I BLD-SCNC: 1.11 MMOL/L (ref 0.95–1.32)
CA-I BLD-SCNC: 1.13 MMOL/L (ref 0.95–1.32)
CALCIUM BLD-MCNC: 7.8 MG/DL (ref 8.5–10.1)
CALCIUM BLD-MCNC: 9.1 MG/DL (ref 8.5–10.1)
CALCIUM BLD-MCNC: 9.4 MG/DL (ref 8.5–10.1)
CALCIUM BLD-MCNC: 9.4 MG/DL (ref 8.5–10.1)
CHLORIDE SERPL-SCNC: 100 MMOL/L (ref 98–112)
CHLORIDE SERPL-SCNC: 103 MMOL/L (ref 98–112)
CHLORIDE SERPL-SCNC: 107 MMOL/L (ref 98–112)
CHLORIDE SERPL-SCNC: 99 MMOL/L (ref 98–112)
CLARITY UR: CLEAR
CO2 SERPL-SCNC: 19 MMOL/L (ref 21–32)
CO2 SERPL-SCNC: 24 MMOL/L (ref 21–32)
CO2 SERPL-SCNC: 25 MMOL/L (ref 21–32)
CO2 SERPL-SCNC: 25 MMOL/L (ref 21–32)
COHGB MFR BLD: 1.5 % (ref 0–3)
COHGB MFR BLD: 1.7 % (ref 0–3)
COLOR UR: YELLOW
CREAT BLD-MCNC: 1.31 MG/DL
CREAT BLD-MCNC: 1.89 MG/DL
CREAT BLD-MCNC: 1.91 MG/DL
CREAT BLD-MCNC: 2.14 MG/DL
DEPRECATED RDW RBC AUTO: 46.8 FL (ref 35.1–46.3)
DEPRECATED RDW RBC AUTO: 51.6 FL (ref 35.1–46.3)
EOSINOPHIL # BLD AUTO: 0 X10(3) UL (ref 0–0.7)
EOSINOPHIL # BLD: 0 X10(3) UL (ref 0–0.7)
EOSINOPHIL NFR BLD AUTO: 0 %
EOSINOPHIL NFR BLD: 0 %
ERYTHROCYTE [DISTWIDTH] IN BLOOD BY AUTOMATED COUNT: 13.2 % (ref 11–15)
ERYTHROCYTE [DISTWIDTH] IN BLOOD BY AUTOMATED COUNT: 13.3 % (ref 11–15)
EST. AVERAGE GLUCOSE BLD GHB EST-MCNC: 100 MG/DL (ref 68–126)
GLOBULIN PLAS-MCNC: 3.3 G/DL (ref 2.8–4.4)
GLOBULIN PLAS-MCNC: 3.6 G/DL (ref 2.8–4.4)
GLUCOSE BLD-MCNC: 115 MG/DL (ref 70–99)
GLUCOSE BLD-MCNC: 154 MG/DL (ref 70–99)
GLUCOSE BLD-MCNC: 83 MG/DL (ref 70–99)
GLUCOSE BLD-MCNC: 88 MG/DL (ref 70–99)
GLUCOSE UR-MCNC: 100 MG/DL
HBA1C MFR BLD: 5.1 % (ref ?–5.7)
HCO3 BLDA-SCNC: 18.7 MEQ/L (ref 21–27)
HCO3 BLDA-SCNC: 21.3 MEQ/L (ref 21–27)
HCT VFR BLD AUTO: 42.8 %
HCT VFR BLD AUTO: 49.8 %
HGB BLD-MCNC: 14.1 G/DL
HGB BLD-MCNC: 14.2 G/DL
HGB BLD-MCNC: 14.7 G/DL
HGB BLD-MCNC: 15.5 G/DL
HYALINE CASTS #/AREA URNS AUTO: PRESENT /LPF
IMM GRANULOCYTES # BLD AUTO: 0.3 X10(3) UL (ref 0–1)
IMM GRANULOCYTES NFR BLD: 1.2 %
KETONES UR-MCNC: 15 MG/DL
LACTATE BLD-SCNC: 1.9 MMOL/L (ref 0.5–2)
LACTATE BLD-SCNC: 3.6 MMOL/L (ref 0.5–2)
LACTATE SERPL-SCNC: 2.2 MMOL/L (ref 0.4–2)
LACTATE SERPL-SCNC: 2.7 MMOL/L (ref 0.4–2)
LACTATE SERPL-SCNC: 5 MMOL/L (ref 0.4–2)
LACTATE SERPL-SCNC: 6.3 MMOL/L (ref 0.4–2)
LEUKOCYTE ESTERASE UR QL STRIP.AUTO: NEGATIVE
LIPASE SERPL-CCNC: 19 U/L (ref 73–393)
LYMPHOCYTES # BLD AUTO: 0.87 X10(3) UL (ref 1–4)
LYMPHOCYTES NFR BLD AUTO: 3.5 %
LYMPHOCYTES NFR BLD: 0.58 X10(3) UL (ref 1–4)
LYMPHOCYTES NFR BLD: 7 %
MAGNESIUM SERPL-MCNC: 1.7 MG/DL (ref 1.6–2.6)
MAGNESIUM SERPL-MCNC: 2.2 MG/DL (ref 1.6–2.6)
MCH RBC QN AUTO: 31.5 PG (ref 26–34)
MCH RBC QN AUTO: 31.8 PG (ref 26–34)
MCHC RBC AUTO-ENTMCNC: 31.1 G/DL (ref 31–37)
MCHC RBC AUTO-ENTMCNC: 32.9 G/DL (ref 31–37)
MCV RBC AUTO: 102.3 FL
MCV RBC AUTO: 95.5 FL
METAMYELOCYTES # BLD: 0.08 X10(3) UL
METAMYELOCYTES NFR BLD: 1 %
METHGB MFR BLD: 1.3 % SAT (ref 0.4–1.5)
METHGB MFR BLD: 1.3 % SAT (ref 0.4–1.5)
MONOCYTES # BLD AUTO: 1.82 X10(3) UL (ref 0.1–1)
MONOCYTES # BLD: 0.58 X10(3) UL (ref 0.1–1)
MONOCYTES NFR BLD AUTO: 7.2 %
MONOCYTES NFR BLD: 7 %
MORPHOLOGY: NORMAL
NEUTROPHILS # BLD AUTO: 22.11 X10 (3) UL (ref 1.5–7.7)
NEUTROPHILS # BLD AUTO: 22.11 X10(3) UL (ref 1.5–7.7)
NEUTROPHILS # BLD AUTO: 6.84 X10 (3) UL (ref 1.5–7.7)
NEUTROPHILS NFR BLD AUTO: 87.8 %
NEUTROPHILS NFR BLD: 80 %
NEUTS BAND NFR BLD: 4 %
NEUTS HYPERSEG # BLD: 6.97 X10(3) UL (ref 1.5–7.7)
NEUTS VAC BLD QL SMEAR: PRESENT
NITRITE UR QL STRIP.AUTO: POSITIVE
O2 CT BLD-SCNC: 20.4 VOL% (ref 15–23)
O2 CT BLD-SCNC: 20.6 VOL% (ref 15–23)
O2/TOTAL GAS SETTING VFR VENT: 40 %
OSMOLALITY SERPL CALC.SUM OF ELEC: 279 MOSM/KG (ref 275–295)
OSMOLALITY SERPL CALC.SUM OF ELEC: 287 MOSM/KG (ref 275–295)
OSMOLALITY SERPL CALC.SUM OF ELEC: 288 MOSM/KG (ref 275–295)
OSMOLALITY SERPL CALC.SUM OF ELEC: 289 MOSM/KG (ref 275–295)
PCO2 BLDA: 33 MM HG (ref 35–45)
PCO2 BLDA: 33 MM HG (ref 35–45)
PEEP SETTING VENT: 5 CM H2O
PH BLDA: 7.32 [PH] (ref 7.35–7.45)
PH BLDA: 7.38 [PH] (ref 7.35–7.45)
PH UR: 5 [PH] (ref 5–8)
PHOSPHATE SERPL-MCNC: 2.6 MG/DL (ref 2.5–4.9)
PHOSPHATE SERPL-MCNC: 2.9 MG/DL (ref 2.5–4.9)
PLATELET # BLD AUTO: 155 10(3)UL (ref 150–450)
PLATELET # BLD AUTO: 159 10(3)UL (ref 150–450)
PLATELET MORPHOLOGY: NORMAL
PO2 BLDA: 166 MM HG (ref 80–100)
PO2 BLDA: 420 MM HG (ref 80–100)
POTASSIUM BLD-SCNC: 3.5 MMOL/L (ref 3.6–5.1)
POTASSIUM BLD-SCNC: 3.8 MMOL/L (ref 3.6–5.1)
POTASSIUM SERPL-SCNC: 3.7 MMOL/L (ref 3.5–5.1)
POTASSIUM SERPL-SCNC: 3.8 MMOL/L (ref 3.5–5.1)
POTASSIUM SERPL-SCNC: 4 MMOL/L (ref 3.5–5.1)
POTASSIUM SERPL-SCNC: 4.5 MMOL/L (ref 3.5–5.1)
PROT SERPL-MCNC: 5.7 G/DL (ref 6.4–8.2)
PROT SERPL-MCNC: 7.2 G/DL (ref 6.4–8.2)
PROT SERPL-MCNC: 7.9 G/DL (ref 6.4–8.2)
PUNCTURE CHARGE: NO
PUNCTURE CHARGE: NO
RBC # BLD AUTO: 4.48 X10(6)UL
RBC # BLD AUTO: 4.87 X10(6)UL
RBC #/AREA URNS AUTO: >10 /HPF
RESP RATE: 14 BPM
RH BLOOD TYPE: POSITIVE
RH BLOOD TYPE: POSITIVE
SAO2 % BLDA: >99 % (ref 94–100)
SAO2 % BLDA: >99 % (ref 94–100)
SARS-COV-2 RNA RESP QL NAA+PROBE: NOT DETECTED
SODIUM BLD-SCNC: 129 MMOL/L (ref 135–145)
SODIUM BLD-SCNC: 130 MMOL/L (ref 135–145)
SODIUM SERPL-SCNC: 132 MMOL/L (ref 136–145)
SODIUM SERPL-SCNC: 136 MMOL/L (ref 136–145)
SODIUM SERPL-SCNC: 136 MMOL/L (ref 136–145)
SODIUM SERPL-SCNC: 137 MMOL/L (ref 136–145)
SP GR UR STRIP: >=1.03 (ref 1–1.03)
SPECIMEN VOL 24H UR: 450 ML
TOTAL CELLS COUNTED BLD: 100
TROPONIN I HIGH SENSITIVITY: 46 NG/L
TROPONIN I HIGH SENSITIVITY: 60 NG/L
TSI SER-ACNC: 1.22 MIU/ML (ref 0.36–3.74)
UROBILINOGEN UR STRIP-ACNC: 2
WBC # BLD AUTO: 25.2 X10(3) UL (ref 4–11)
WBC # BLD AUTO: 8.3 X10(3) UL (ref 4–11)

## 2022-05-19 PROCEDURE — 99223 1ST HOSP IP/OBS HIGH 75: CPT | Performed by: INTERNAL MEDICINE

## 2022-05-19 PROCEDURE — 02HV33Z INSERTION OF INFUSION DEVICE INTO SUPERIOR VENA CAVA, PERCUTANEOUS APPROACH: ICD-10-PCS | Performed by: HOSPITALIST

## 2022-05-19 PROCEDURE — 99223 1ST HOSP IP/OBS HIGH 75: CPT | Performed by: HOSPITALIST

## 2022-05-19 PROCEDURE — 0DTF0ZZ RESECTION OF RIGHT LARGE INTESTINE, OPEN APPROACH: ICD-10-PCS | Performed by: SURGERY

## 2022-05-19 PROCEDURE — 71045 X-RAY EXAM CHEST 1 VIEW: CPT | Performed by: SURGERY

## 2022-05-19 PROCEDURE — 0DN80ZZ RELEASE SMALL INTESTINE, OPEN APPROACH: ICD-10-PCS | Performed by: SURGERY

## 2022-05-19 PROCEDURE — 70450 CT HEAD/BRAIN W/O DYE: CPT | Performed by: EMERGENCY MEDICINE

## 2022-05-19 PROCEDURE — 71045 X-RAY EXAM CHEST 1 VIEW: CPT | Performed by: CLINICAL NURSE SPECIALIST

## 2022-05-19 PROCEDURE — 71045 X-RAY EXAM CHEST 1 VIEW: CPT | Performed by: EMERGENCY MEDICINE

## 2022-05-19 PROCEDURE — 74019 RADEX ABDOMEN 2 VIEWS: CPT | Performed by: SURGERY

## 2022-05-19 PROCEDURE — 74176 CT ABD & PELVIS W/O CONTRAST: CPT | Performed by: EMERGENCY MEDICINE

## 2022-05-19 PROCEDURE — 93306 TTE W/DOPPLER COMPLETE: CPT | Performed by: HOSPITALIST

## 2022-05-19 RX ORDER — INDOCYANINE GREEN AND WATER 25 MG
KIT INJECTION AS NEEDED
Status: DISCONTINUED | OUTPATIENT
Start: 2022-05-19 | End: 2022-05-19 | Stop reason: SURG

## 2022-05-19 RX ORDER — ACETAMINOPHEN 650 MG/1
650 SUPPOSITORY RECTAL EVERY 4 HOURS PRN
Status: DISCONTINUED | OUTPATIENT
Start: 2022-05-19 | End: 2022-05-21

## 2022-05-19 RX ORDER — MORPHINE SULFATE 2 MG/ML
INJECTION, SOLUTION INTRAMUSCULAR; INTRAVENOUS EVERY 2 HOUR PRN
Status: DISCONTINUED | OUTPATIENT
Start: 2022-05-19 | End: 2022-05-20 | Stop reason: HOSPADM

## 2022-05-19 RX ORDER — ACETAMINOPHEN 325 MG/1
650 TABLET ORAL EVERY 4 HOURS PRN
Status: DISCONTINUED | OUTPATIENT
Start: 2022-05-19 | End: 2022-05-21

## 2022-05-19 RX ORDER — SODIUM CHLORIDE 9 MG/ML
INJECTION, SOLUTION INTRAVENOUS CONTINUOUS
Status: DISCONTINUED | OUTPATIENT
Start: 2022-05-19 | End: 2022-05-21

## 2022-05-19 RX ORDER — DEXTROSE, SODIUM CHLORIDE, AND POTASSIUM CHLORIDE 5; .45; .15 G/100ML; G/100ML; G/100ML
INJECTION INTRAVENOUS CONTINUOUS
Status: DISCONTINUED | OUTPATIENT
Start: 2022-05-19 | End: 2022-05-21

## 2022-05-19 RX ORDER — LIDOCAINE HYDROCHLORIDE 10 MG/ML
5 INJECTION, SOLUTION EPIDURAL; INFILTRATION; INTRACAUDAL; PERINEURAL
Status: COMPLETED | OUTPATIENT
Start: 2022-05-19 | End: 2022-05-19

## 2022-05-19 RX ORDER — ACETAMINOPHEN 10 MG/ML
1000 INJECTION, SOLUTION INTRAVENOUS EVERY 6 HOURS PRN
Status: DISCONTINUED | OUTPATIENT
Start: 2022-05-19 | End: 2022-05-21

## 2022-05-19 RX ORDER — ACETAMINOPHEN 160 MG/5ML
650 SOLUTION ORAL EVERY 4 HOURS PRN
Status: DISCONTINUED | OUTPATIENT
Start: 2022-05-19 | End: 2022-05-21

## 2022-05-19 RX ORDER — ROCURONIUM BROMIDE 10 MG/ML
INJECTION, SOLUTION INTRAVENOUS AS NEEDED
Status: DISCONTINUED | OUTPATIENT
Start: 2022-05-19 | End: 2022-05-19 | Stop reason: SURG

## 2022-05-19 RX ORDER — FAMOTIDINE 10 MG/ML
20 INJECTION, SOLUTION INTRAVENOUS DAILY
Status: DISCONTINUED | OUTPATIENT
Start: 2022-05-19 | End: 2022-05-20

## 2022-05-19 RX ORDER — CHLORHEXIDINE GLUCONATE 0.12 MG/ML
15 RINSE ORAL
Status: DISCONTINUED | OUTPATIENT
Start: 2022-05-19 | End: 2022-05-20

## 2022-05-19 RX ORDER — SENNOSIDES 8.8 MG/5ML
10 LIQUID ORAL NIGHTLY PRN
Status: DISCONTINUED | OUTPATIENT
Start: 2022-05-19 | End: 2022-05-21

## 2022-05-19 RX ORDER — LIDOCAINE HYDROCHLORIDE 10 MG/ML
INJECTION, SOLUTION EPIDURAL; INFILTRATION; INTRACAUDAL; PERINEURAL AS NEEDED
Status: DISCONTINUED | OUTPATIENT
Start: 2022-05-19 | End: 2022-05-19 | Stop reason: SURG

## 2022-05-19 RX ORDER — POLYETHYLENE GLYCOL 3350 17 G/17G
17 POWDER, FOR SOLUTION ORAL DAILY PRN
Status: DISCONTINUED | OUTPATIENT
Start: 2022-05-19 | End: 2022-05-21

## 2022-05-19 RX ORDER — BISACODYL 10 MG
10 SUPPOSITORY, RECTAL RECTAL
Status: DISCONTINUED | OUTPATIENT
Start: 2022-05-19 | End: 2022-05-21

## 2022-05-19 RX ORDER — SODIUM CHLORIDE, SODIUM LACTATE, POTASSIUM CHLORIDE, CALCIUM CHLORIDE 600; 310; 30; 20 MG/100ML; MG/100ML; MG/100ML; MG/100ML
INJECTION, SOLUTION INTRAVENOUS CONTINUOUS PRN
Status: DISCONTINUED | OUTPATIENT
Start: 2022-05-19 | End: 2022-05-19 | Stop reason: SURG

## 2022-05-19 RX ORDER — SODIUM CHLORIDE 9 MG/ML
100 INJECTION, SOLUTION INTRAVENOUS CONTINUOUS
Status: DISCONTINUED | OUTPATIENT
Start: 2022-05-19 | End: 2022-05-21

## 2022-05-19 RX ADMIN — SODIUM CHLORIDE, SODIUM LACTATE, POTASSIUM CHLORIDE, CALCIUM CHLORIDE: 600; 310; 30; 20 INJECTION, SOLUTION INTRAVENOUS at 12:05:00

## 2022-05-19 RX ADMIN — ROCURONIUM BROMIDE 20 MG: 10 INJECTION, SOLUTION INTRAVENOUS at 12:03:00

## 2022-05-19 RX ADMIN — ROCURONIUM BROMIDE 20 MG: 10 INJECTION, SOLUTION INTRAVENOUS at 12:12:00

## 2022-05-19 RX ADMIN — SODIUM CHLORIDE, SODIUM LACTATE, POTASSIUM CHLORIDE, CALCIUM CHLORIDE: 600; 310; 30; 20 INJECTION, SOLUTION INTRAVENOUS at 13:19:00

## 2022-05-19 RX ADMIN — LIDOCAINE HYDROCHLORIDE 40 MG: 10 INJECTION, SOLUTION EPIDURAL; INFILTRATION; INTRACAUDAL; PERINEURAL at 11:33:00

## 2022-05-19 RX ADMIN — ROCURONIUM BROMIDE 5 MG: 10 INJECTION, SOLUTION INTRAVENOUS at 11:32:00

## 2022-05-19 RX ADMIN — SODIUM CHLORIDE, SODIUM LACTATE, POTASSIUM CHLORIDE, CALCIUM CHLORIDE: 600; 310; 30; 20 INJECTION, SOLUTION INTRAVENOUS at 11:27:00

## 2022-05-19 RX ADMIN — INDOCYANINE GREEN AND WATER 5 MG: 25 MG KIT INJECTION at 13:05:00

## 2022-05-19 RX ADMIN — ROCURONIUM BROMIDE 35 MG: 10 INJECTION, SOLUTION INTRAVENOUS at 11:49:00

## 2022-05-19 NOTE — PROGRESS NOTES
Pt received from ED with wife. Generalized weakness. NGT to david TAPIA present. Pt lethargic at this time. No signs of distress or discomfort. Pt is very hard stick. IV site infiltrated upon initial assessment. Multiple phlebotomist needed for blood draw. Vascular consult added.

## 2022-05-19 NOTE — ED QUICK NOTES
Orders for admission, patient is aware of plan and ready to go upstairs. Any questions, please call ED RN Epi Sheppard  at extension [phone].      Type of COVID test sent: Rapid - negative     Titratable drug(s) infusing:n/a  Rate:    LOC at time of transport:a/o x2-3; hx of dementia    Other pertinent information    CIWA score=n/a  NIH score=n/a

## 2022-05-19 NOTE — H&P
[facility]    PATIENT'S NAME: AARTI PFEIFFER   ATTENDING PHYSICIAN: Eliecer Jessica MD   PATIENT ACCOUNT#:   [id number]    LOCATION:  [address]  MEDICAL RECORD #:   [id number]       YOB: 1940  ADMISSION DATE:       05/18/2022    HISTORY AND PHYSICAL EXAMINATION    (Addendum added 5/19/2022)    DATE OF EXAMINATION:  05/19/2022    CHIEF COMPLAINT:  Poor appetite, orange urine, lethargy, weakness. HISTORY OF PRESENT ILLNESS:  This is an [de-identified] gentleman with a history of dementia. Most of his presenting history is obtained from the family. They say that for the past 4 days or so, the patient has eaten nothing. He was drinking water. He has had no vomiting. His bowel movements have been normal, but he has not had one for a couple of days. They believe because he was not eating. They did notice a change in the color of his urine, it became more orange in color. He has had no cough or sinus congestion. He has been more fatigued than usual and napping a lot. He has been holding his abdomen at times but not complaining of pain. Also holding his lower back as well. He fell a couple of days ago. He is less conversational than typically. He has had no vomiting or sick contacts. The patient was sleeping when I arrived to see him in the emergency room. He did wake and answered some yes and no questions, was complaining of abdominal discomfort. When I asked him about it, he could not elucidate. When he arrived in the emergency room, his temperature was 97.7, pulse was 102, respiratory rate was 18. His blood pressure was 130/92. Workup in the emergency room initially consisted of blood work that included a lipase of 19, 1 to 5 white cells in the urine, greater than 10 red cells, rare bacteria, few squamous epithelial cells, nitrite was positive.   Glucose was 115, sodium 132, potassium is not reported at this time, chloride 99, CO2 of 25, BUN of 23 with a creatinine of 2.14.  Troponin was 46. Rapid COVID testing was negative. White count was 21.2 with a hemoglobin of 15 and a platelet count of 527,052. There were 90% neutrophils. Potassium was later reported as 4.5. Reviewing the medical record, his creatinine in March 2020 was 1.10. In [facility], the patient appeared to have a lab work done the day prior to admission. Creatinine was 1.27 at that time with normal LFTs and chemistries. Chest x-ray revealed mild bibasilar probable atelectasis with low lung volumes. Pneumonia could not be excluded. Cardiomediastinal silhouette was unremarkable. There was no pneumothorax or pleural effusion. No acute bony abnormalities. CT scan of the brain revealed no evidence of acute intracranial abnormality or hemorrhage. There was no mass. There was mild periventricular and subcortical regions of low attenuation likely representing chronic small-vessel disease or small chronic-appearing lacunar infarcts in the left thalamus. CT of the abdomen revealed a short segment of small bowel in the right upper quadrant that was moderately dilated to 3.5 cm. Two adjacent transition zones were identified concerning for an internal hernia or closed loop obstruction, possibly secondary to complex adhesions. The distal small bowel and large bowel are relatively decompressed. There is a moderate high-density fluid in the region tracking into the pelvis concerning for blood products. Additional high density was in the small bowel in the region concerning for intramural hemorrhage. There is no definite pneumatosis or free air. There is suspicion however for ischemic bowel or proteinaceous fluid, possibly secondary to a perforation is also a consideration. The emergency room called urgent surgical consultation. NG tube to low intermittent suction was recommended. Patient was started on IV Zosyn.   His blood pressure remained stable, but given his elevated lactic acid level of 2.7, he was given a sepsis bolus and placed on 150 mL/h after that. He will be admitted to the PCU for closer monitoring. PAST MEDICAL HISTORY:  Significant for dementia, syncope which was felt to most likely be related to vasovagal syncope. The patient was evaluated by Dr. Bon Amos in 2020. His echo was okay at that time and neuro workup revealed no significant EEG abnormality. Hypertension, enlarged prostate, hyperlipidemia, history of bradycardia, benign prostatic hypertrophy. MEDICATIONS:  Prior to admission, atorvastatin 20 mg p.o. nightly, amlodipine 5 mg p.o. daily, losartan 50 mg p.o. twice a day, aspirin 81 mg a day, tamsulosin 0.4 mg twice a day, memantine 5 mg twice a day, finasteride 5 mg daily, gabapentin 300 mg twice a day, vitamin B12 at 1000 mcg daily. ALLERGIES:  No known drug allergies. FAMILY HISTORY:  There is apparently a history of cancer in his father, type is not known. Sister had diabetes and hypertension. SOCIAL HISTORY:  The patient never smoked. Drinks beer on occasion. No drug use. He is a retired assemblyman for a [employer] He is  and lives with his wife. REVIEW OF SYSTEMS:  Twelve systems were reviewed. There were no additional pertinent positives or negatives on the 12-point review of systems, although the patient's ability to report symptoms is lucid given his dementia. PHYSICAL EXAMINATION:    VITAL SIGNS:  Temperature was 97.7 when I saw the patient, his pulse was 94, respiratory rate was 24, blood pressure was 134/71, O2 saturation was 93% on room air. HEENT:  Normocephalic, atraumatic. Pupils equal, reactive. Sclerae anicteric. There was no sinus tenderness. Mucous membranes were quite dry. There was some temporal wasting. NECK:  Supple. LUNGS:  Crackles at the bases posteriorly and somewhat poor air entry. No wheezes. HEART:  Regular rate and rhythm. Normal S1, S2.  I did not appreciate a murmur.   ABDOMEN:  Slightly distended with perhaps some mild voluntary guarding. Exam is somewhat compromised by the patient's dementia, but he did seem to have discomfort generally throughout the abdomen but worse in the mid upper abdomen. Bowel sounds were hypoactive. EXTREMITIES:  No clubbing, cyanosis, calf tenderness or edema. SKIN:  Warm and dry, without any significant rashes. NEUROLOGIC:  The patient was sleepy, arousable. Answered a few questions, did not initiate conversation. Could not cooperate entirely with the neuro exam, but was moving all 4 of his extremities. There was no facial asymmetry. PSYCHIATRIC:  His mood and affect were pleasant and cooperative. No agitation. BACK:  There was no costovertebral angle tenderness. LABORATORY DATA:  Previously mentioned. ASSESSMENT AND PLAN:    1. Sepsis with elevated lactic acid level, elevated white blood cell count. Most likely source intra-abdominal, possible ischemic/infarcted bowel or perforation. Surgical consultation obtained. NG tube placed to low intermittent suction. Patient given IV Zosyn. Blood cultures sent. We will follow serial lactic acids. The patient received a sepsis bolus and is on IV fluids at 150 mL an hour. Await surgical recommendations. 2.   Leukocytosis secondary to #1.  3.   Acute kidney injury. Creatinine doubled based on 24 hours ago. Continue IV fluids. We will place Pearl catheter to better follow I's and O's.  4.   History of grade 1 diastolic dysfunction. We will need to monitor patient closely while receiving IV fluids. 5.   Bilateral probable atelectasis. The patient has had no episodes of vomiting and has not eaten in 4 days. Certainly, aspiration pneumonia is a possibility; however, the patient has already been started on Zosyn which would be good for this as well. We will place on oxygen protocol and follow continuous pulse ox.   6.   Liver function test is essentially normal except for a total bilirubin of 3.9.  7.   Mild nonfasting hyperglycemia, likely stress related. We will check A1c level. 8.   Mild hyponatremia, likely related to poor p.o. intake and only on water for 4 days. Continue to monitor electrolytes. 9.   GI prophylaxis. Protonix. 10.   DVT prophylaxis. SCDs. We will hold anticoagulation given the possibility that the patient may require urgent surgical intervention. Discussed at length with family regarding the patient's potential serious illness. They are aware that if there is concern for ischemic or perhaps infarcted bowel, the patient may require surgery. They understand that he would be at high risk for same. The patient had a fall apparently a couple of days ago and complaining of low back pain. He is not on anticoagulants. No evidence of any bony abnormalities. However, when CT scan is over-read by our radiologist, this can be addressed as well. 11.   Dementia. The patient is not agitated at this point and the wife states that he typically is not at home. Will be monitored closely in the PCU. 12. The patient's current clinical status and proposed treatment plan was discussed with his family. The patient himself was unable to grasp his current status. All questions were answered, and they agreed with the plan of care as outlined above. ADDENDUM:  (Also, job [id number])    Review of EKG reveals right bundle branch block, which is new from 2020. Will order echocardiogram and cardiology evaluation. Initial troponin is negative currently. Phlebotomy is having a difficult time drawing his blood and IV has infiltrated, nursing cannot place it. They will discuss with Dr. Haritha Rome, perhaps he would like to place a triple-lumen line for both IV access and blood drawn.     Dictated By Nadiya Zaman MD  d: 05/19/2022 04:09:17  t: 05/19/2022 04:27:31  Job [id number]  [facility]/

## 2022-05-19 NOTE — PROGRESS NOTES
Please refer to H+P by Dr. Storm Garcia  Patient here with bowel obstruction, plan for surgery today  Likely will need critical care post-op    Kait Hodges MD

## 2022-05-19 NOTE — VASCULAR ACCESS
PICC insertion for poor venous access. Pt for OR. Double lumen PICC inserted to Lt brachial vein-had difficulty threading catheter tip to the SVC. Tip kept crossing midline and pointing cephalad. Portable CXR requested to locate tip placement. Patient tolerated procedure well. Currently getting echo done.

## 2022-05-19 NOTE — ED QUICK NOTES
NGT inserted to right nare - secured at 65\"  Clear green - gastric secretions aspirated. Air bolus heard over stomach & CXR performed. Pt tolerated well.

## 2022-05-19 NOTE — H&P
[facility]    PATIENT'S NAME: Chucky Ac PHYSICIAN: Sowmya Kwan MD   PATIENT ACCOUNT#:   [id number]    LOCATION:  [facility] [address] [place] [address] RECORD #:   [id number]       YOB: 1940  ADMISSION DATE:       05/18/2022    HISTORY AND PHYSICAL EXAMINATION    Review of EKG reveals right bundle branch block, which is new from 2020. Will order echocardiogram and cardiology evaluation. Initial troponin is negative currently. Phlebotomy is having a difficult time drawing his blood and IV has infiltrated, nursing cannot place it. They will discuss with Dr. Diane German, perhaps he would like to place a triple-lumen line for both IV access and blood drawn.     Dictated By Sowmya Kwan MD  d: 05/19/2022 06:47:54  t: 05/19/2022 00:40:51  Job [id number]  [facility]/

## 2022-05-19 NOTE — PROGRESS NOTES
[facility] Pharmacy Note:  Renal Dose Adjustment    Don Yu has been prescribed famotidine (PEPCID) 20 mg intravenously every 12 hours. Estimated Creatinine Clearance: 23.4 mL/min (A) (based on SCr of 2.14 mg/dL (H)). Calculated creatinine clearance is < 50 ml/min, therefore the dose of famotidine (Pepcid) has been changed to 20 mg intravenously every 24 hours per P&T approved protocol. Pharmacy will continue to follow, and if renal function improves, will resume the original order.     Thank you,  Syd Montoya, PharmD  5/19/2022 5:33 AM

## 2022-05-19 NOTE — ANESTHESIA PROCEDURE NOTES
Arterial Line  Performed by: Lauryn Herrera DO  Authorized by: Lauryn Herrera DO     General Information and Staff    Procedure Start:  5/19/2022 12:40 PM  Procedure End:  5/19/2022 12:44 PM  Anesthesiologist:  Lauryn Herrera DO  Performed By:  Anesthesiologist  Patient Location:  OR  Indication: continuous blood pressure monitoring and blood sampling needed    Site Identification: surface landmarks    Preanesthetic Checklist: 2 patient identifiers, IV checked, risks and benefits discussed, monitors and equipment checked, pre-op evaluation, timeout performed, anesthesia consent and sterile technique used    Procedure Details    Catheter Size:  20 G  Catheter Length:  1 and 3/4 inchCatheter Type:  Arrow  Seldinger Technique?: Yes    Laterality:  LeftSite:  Radial artery  Site Prep: chlorhexidine  Line Secured:  Wrist Brace, tape and Tegaderm    Assessment    Events: patient tolerated procedure well with no complications      Medications      Additional Comments

## 2022-05-19 NOTE — BRIEF OP NOTE
Pre-Operative Diagnosis: Small bowel obstruction ([facility] [address] [K56.609] with acute abdomen and peritonitis     Post-Operative Diagnosis: Necrotic small bowel obstruction     Procedure Performed: Exploratory laparotomy with right hemicolectomy spy fluorescence technology extensive lysis of adhesions    Surgeon(s) and Role:     Evan Clements MD - Primary    Assistant(s):  Surgical Assistant.: Isac Clarke CSA     Surgical Findings: Necrotic small bowel from internal hernia and small bowel obstruction     Specimen: Ileum and right colon     Estimated Blood Loss: Blood Output: 100 mL (5/19/2022  1:53 PM)      Dictation Number:  [id number]    Tania Perry MD  5/19/2022  2:22 PM

## 2022-05-20 ENCOUNTER — APPOINTMENT (OUTPATIENT)
Dept: GENERAL RADIOLOGY | Facility: HOSPITAL | Age: 82
End: 2022-05-20
Attending: INTERNAL MEDICINE
Payer: MEDICARE

## 2022-05-20 LAB
ALBUMIN SERPL-MCNC: 1.8 G/DL (ref 3.4–5)
ALBUMIN/GLOB SERPL: 0.6 {RATIO} (ref 1–2)
ALP LIVER SERPL-CCNC: 33 U/L
ALT SERPL-CCNC: 48 U/L
ANION GAP SERPL CALC-SCNC: 9 MMOL/L (ref 0–18)
AST SERPL-CCNC: 30 U/L (ref 15–37)
BASOPHILS # BLD AUTO: 0.03 X10(3) UL (ref 0–0.2)
BASOPHILS NFR BLD AUTO: 0.3 %
BILIRUB SERPL-MCNC: 3.9 MG/DL (ref 0.1–2)
BUN BLD-MCNC: 26 MG/DL (ref 7–18)
BUN/CREAT SERPL: 18.3 (ref 10–20)
CALCIUM BLD-MCNC: 7.7 MG/DL (ref 8.5–10.1)
CHLORIDE SERPL-SCNC: 106 MMOL/L (ref 98–112)
CO2 SERPL-SCNC: 21 MMOL/L (ref 21–32)
CREAT BLD-MCNC: 1.42 MG/DL
DEPRECATED RDW RBC AUTO: 45.5 FL (ref 35.1–46.3)
DOHLE BOD BLD QL SMEAR: PRESENT
EOSINOPHIL # BLD AUTO: 0 X10(3) UL (ref 0–0.7)
EOSINOPHIL NFR BLD AUTO: 0 %
ERYTHROCYTE [DISTWIDTH] IN BLOOD BY AUTOMATED COUNT: 13.2 % (ref 11–15)
GLOBULIN PLAS-MCNC: 3.2 G/DL (ref 2.8–4.4)
GLUCOSE BLD-MCNC: 109 MG/DL (ref 70–99)
HCT VFR BLD AUTO: 36.7 %
HGB BLD-MCNC: 12.3 G/DL
IMM GRANULOCYTES # BLD AUTO: 0.03 X10(3) UL (ref 0–1)
IMM GRANULOCYTES NFR BLD: 0.3 %
LYMPHOCYTES # BLD AUTO: 0.59 X10(3) UL (ref 1–4)
LYMPHOCYTES NFR BLD AUTO: 5.8 %
MCH RBC QN AUTO: 31.5 PG (ref 26–34)
MCHC RBC AUTO-ENTMCNC: 33.5 G/DL (ref 31–37)
MCV RBC AUTO: 93.9 FL
MONOCYTES # BLD AUTO: 0.6 X10(3) UL (ref 0.1–1)
MONOCYTES NFR BLD AUTO: 5.9 %
NEUTROPHILS # BLD AUTO: 8.88 X10 (3) UL (ref 1.5–7.7)
NEUTROPHILS # BLD AUTO: 8.88 X10(3) UL (ref 1.5–7.7)
NEUTROPHILS NFR BLD AUTO: 87.7 %
NEUTS VAC BLD QL SMEAR: PRESENT
OSMOLALITY SERPL CALC.SUM OF ELEC: 287 MOSM/KG (ref 275–295)
PLATELET # BLD AUTO: 119 10(3)UL (ref 150–450)
PLATELET MORPHOLOGY: NORMAL
POTASSIUM SERPL-SCNC: 4.5 MMOL/L (ref 3.5–5.1)
PROT SERPL-MCNC: 5 G/DL (ref 6.4–8.2)
RBC # BLD AUTO: 3.91 X10(6)UL
SODIUM SERPL-SCNC: 136 MMOL/L (ref 136–145)
TOXIC GRANULES BLD QL SMEAR: PRESENT
TRIGL SERPL-MCNC: 45 MG/DL (ref 30–149)
WBC # BLD AUTO: 10.1 X10(3) UL (ref 4–11)

## 2022-05-20 PROCEDURE — 99233 SBSQ HOSP IP/OBS HIGH 50: CPT | Performed by: INTERNAL MEDICINE

## 2022-05-20 PROCEDURE — 71045 X-RAY EXAM CHEST 1 VIEW: CPT | Performed by: INTERNAL MEDICINE

## 2022-05-20 RX ORDER — FAMOTIDINE 10 MG/ML
20 INJECTION, SOLUTION INTRAVENOUS DAILY
Status: DISCONTINUED | OUTPATIENT
Start: 2022-05-20 | End: 2022-05-21

## 2022-05-20 RX ORDER — HEPARIN SODIUM 5000 [USP'U]/ML
5000 INJECTION, SOLUTION INTRAVENOUS; SUBCUTANEOUS EVERY 8 HOURS SCHEDULED
Status: DISCONTINUED | OUTPATIENT
Start: 2022-05-20 | End: 2022-05-21

## 2022-05-20 RX ORDER — MORPHINE SULFATE 2 MG/ML
INJECTION, SOLUTION INTRAMUSCULAR; INTRAVENOUS
Status: DISCONTINUED | OUTPATIENT
Start: 2022-05-20 | End: 2022-06-14

## 2022-05-20 RX ORDER — ACETAMINOPHEN 10 MG/ML
1000 INJECTION, SOLUTION INTRAVENOUS EVERY 6 HOURS
Status: DISCONTINUED | OUTPATIENT
Start: 2022-05-20 | End: 2022-05-21

## 2022-05-20 NOTE — OPERATIVE REPORT
[facility]    PATIENT'S NAME: Maritza Dubois PHYSICIAN: Sukhdeep Sánchez MD   OPERATING PHYSICIAN: Trevor Isabel MD   PATIENT ACCOUNT#:   [de-identified]    LOCATION:  [address] [place] [address] RECORD #:   [id number]       YOB: 1940  ADMISSION DATE:       05/18/2022      OPERATION DATE:  05/19/2022    OPERATIVE REPORT      PREOPERATIVE DIAGNOSIS:  Acute abdomen with high-grade small-bowel obstruction and peritonitis. POSTOPERATIVE DIAGNOSIS:  Necrotic small bowel obstruction with the bowel. PROCEDURE:  Exploratory laparotomy with lysis of adhesions and extended right hemicolectomy with SPY fluorescence technology. Closure of enterotomy adherent to the operation. ASSISTANT:  Jennifer Hawkins CSA. The role of my assistant was critical to the operation. She helped in positioning the patient, retraction, exposure during the operation, resection of the small bowel and colon with anastomosis, closure of the wound. ANESTHESIA:  General endotracheal tube. ESTIMATED BLOOD LOSS:  100 mL. COMPLICATIONS:  None. INDICATIONS:  An 28-year-old black male who presents from home with acute abdomen. CT scan reveals small bowel obstruction, possible closed loop. I evaluated the patient. Patient with peritonitis and patient will require emergency surgery. A lengthy discussion with the patient's wife by phone as to the etiology of the above.   Risks of procedure including bleeding, infection, postoperative hematoma, wound infection, nonhealing wound, scar formation, need for possible bowel resection, need for possible colostomy, increased risk for death due to the severe nature of the critical condition of the patient, need for possible blood transfusion, risk of blood-borne infection, as well as risk with anesthesia including myocardial infarction, respiratory failure, renal failure, pulmonary embolus, DVT, CVA, and even significantly high risk of death was all discussed in detail with the patient's wife by phone who understands, consents, and wished to proceed with the operation. OPERATIVE TECHNIQUE:  The patient was brought to the operating room and placed on the operative table in supine position. General anesthetic was administered via endotracheal tube anesthesia. The patient's abdomen was prepped and draped in routine sterile fashion. A midline incision was made with a 10 blade. Dissection continued down through subcutaneous tissue using electrocautery. Fascia was opened with the incision. Extensive adhesions noted on the anterior abdominal wall. Adhesions taken down using blunt and sharp dissection. The adhesions were removed, and the small bowel was freed up. There was marked dilated loops of small bowel. There was a segment of small bowel in the right upper quadrant that was a closed loop obstruction from a single adhesive band causing a significant amount of necrotic small bowel. This extended from the ileocecal valve proximally into the ileum. The small bowel was freed up with lysis of adhesions. The bowel was untwisted and visualized. It was felt that the extended right hemicolectomy would be performed due to the fact that this extended right up to the ileocecal valve. The necrotic area was proximal.  There was some cyanosis present. Intraoperative SPY technology was performed. Intravenous ICG green was given by Anesthesia. Using the [facility] technology, visualization of the small bowel in the ascending colon revealed normal visualization of the bowel proximal to the region of where the resection would be in the right colon as well as the proximal small bowel. The area was viable without difficulty. The area was marked without difficulty. The mesentery was scored and then the small bowel as well as the proximal ascending colon was divided with a JAMILA 75 stapling device. The mesentery was then clamped and ligated with 0 silk ties.   There was no other abnormality present. There was a large amount of inflammatory reaction with blood colored fluid in the abdomen and pelvis. There was no bleeding present. This was all related to the necrotic small bowel that had been present for a while. The side-to-side anastomosis would be performed for making an enterotomy in the proximal small bowel as well as the proximal hepatic flexure. An enterotomy was made in the proximal small bowel during lysis of adhesions. This was not a full-thickness enterotomy but deserosalized area. Due to this fact, it was felt that a primary repair using a 3-0 silk seromuscular Lembert sutures to reapproximate the serosa of the proximal small bowel. There was no leak present. Enterotomy was made with scissors and a JAMILA 75 stapling device was placed in each segment of the bowel, and the anterior mesenteric border was visualized and this was closed and fired without difficulty. The staple line was noted to be intact with no bleeding present. The remaining enterotomy was then closed with a TA-55 stapling device. The mesentery was visualized. This was closed with 2-0 chromic running suture. The small bowel was visualized a final time with the [facility] technology and there was no evidence of any abnormality involving the terminal ileum, proximal ileum or the hepatic flexure. The entire abdominal cavity was irrigated and aspirated with a copious amount of saline solution. During the operation, the abdominal wound was packed off with Betadine-soaked laps. Using bowel technique, instruments and gowns and drapes were changed. The small bowel was placed back in abdominal cavity and the omentum was placed over the small bowel. There were no other abnormalities of the colon present. Stomach was normal.  The fascia was then closed in the midline using 0 looped PDS running suture in multiple short segments. The wounds were irrigated thoroughly with saline solution.   The wound was to be packed by [facility] for [employer] wound VAC due to the amount of contamination present. Therefore, the wound was not closed. The wound was packed with Betadine-soaked, Kerlix roll with 4 x 4's and ABD gauze. The patient was transferred off the operative table to intensive care unit, intubated, in stable but critical condition. All counts were correct at the end of the case. The patient tolerated the procedure well without any complications. The patient transferred off the operative table to the intensive care unit, intubated in stable but critical condition. All counts were correct at the end of the case.       Dictated By Solitario Watkins MD  d: 05/19/2022 14:31:12  t: 05/19/2022 22:00:51  Job [id number]  MM/    cc: MD Dewey Martell MD Judd Midget, MD

## 2022-05-20 NOTE — PLAN OF CARE
Problem: RESPIRATORY - ADULT  Goal: Achieves optimal ventilation and oxygenation  Description: INTERVENTIONS:  - Assess for changes in respiratory status  - Assess for changes in mentation and behavior  - Position to facilitate oxygenation and minimize respiratory effort  - Oxygen supplementation based on oxygen saturation or ABGs  - Provide Smoking Cessation handout, if applicable  - Encourage broncho-pulmonary hygiene including cough, deep breathe, Incentive Spirometry  - Assess the need for suctioning and perform as needed  - Assess and instruct to report SOB or any respiratory difficulty  - Respiratory Therapy support as indicated  - Manage/alleviate anxiety  - Monitor for signs/symptoms of CO2 retention  Outcome: Progressing Pt received intubated on full support AC 14/450/+5/40%. ETT 8.0 secure 24 @ lips. Bilateral BS auscultated. Suction provided as indicated. FIO2 weaned to 30%. No acute events on shift.

## 2022-05-21 ENCOUNTER — APPOINTMENT (OUTPATIENT)
Dept: GENERAL RADIOLOGY | Facility: HOSPITAL | Age: 82
End: 2022-05-21
Attending: INTERNAL MEDICINE
Payer: MEDICARE

## 2022-05-21 ENCOUNTER — APPOINTMENT (OUTPATIENT)
Dept: GENERAL RADIOLOGY | Facility: HOSPITAL | Age: 82
End: 2022-05-21
Attending: SURGERY
Payer: MEDICARE

## 2022-05-21 LAB
ALBUMIN SERPL-MCNC: 1.5 G/DL (ref 3.4–5)
ALBUMIN/GLOB SERPL: 0.5 {RATIO} (ref 1–2)
ALP LIVER SERPL-CCNC: 39 U/L
ALT SERPL-CCNC: 27 U/L
ANION GAP SERPL CALC-SCNC: 7 MMOL/L (ref 0–18)
AST SERPL-CCNC: 21 U/L (ref 15–37)
BASOPHILS # BLD AUTO: 0.02 X10(3) UL (ref 0–0.2)
BASOPHILS NFR BLD AUTO: 0.2 %
BILIRUB SERPL-MCNC: 3.2 MG/DL (ref 0.1–2)
BUN BLD-MCNC: 22 MG/DL (ref 7–18)
BUN/CREAT SERPL: 16.3 (ref 10–20)
CALCIUM BLD-MCNC: 7.6 MG/DL (ref 8.5–10.1)
CHLORIDE SERPL-SCNC: 115 MMOL/L (ref 98–112)
CO2 SERPL-SCNC: 19 MMOL/L (ref 21–32)
CREAT BLD-MCNC: 1.35 MG/DL
DEPRECATED RDW RBC AUTO: 45.3 FL (ref 35.1–46.3)
EOSINOPHIL # BLD AUTO: 0 X10(3) UL (ref 0–0.7)
EOSINOPHIL NFR BLD AUTO: 0 %
ERYTHROCYTE [DISTWIDTH] IN BLOOD BY AUTOMATED COUNT: 13.2 % (ref 11–15)
GLOBULIN PLAS-MCNC: 3.1 G/DL (ref 2.8–4.4)
GLUCOSE BLD-MCNC: 79 MG/DL (ref 70–99)
HCT VFR BLD AUTO: 29.8 %
HGB BLD-MCNC: 10.3 G/DL
IMM GRANULOCYTES # BLD AUTO: 0.09 X10(3) UL (ref 0–1)
IMM GRANULOCYTES NFR BLD: 0.8 %
LYMPHOCYTES # BLD AUTO: 0.42 X10(3) UL (ref 1–4)
LYMPHOCYTES NFR BLD AUTO: 3.7 %
MCH RBC QN AUTO: 32.4 PG (ref 26–34)
MCHC RBC AUTO-ENTMCNC: 34.6 G/DL (ref 31–37)
MCV RBC AUTO: 93.7 FL
MONOCYTES # BLD AUTO: 0.52 X10(3) UL (ref 0.1–1)
MONOCYTES NFR BLD AUTO: 4.6 %
NEUTROPHILS # BLD AUTO: 10.21 X10 (3) UL (ref 1.5–7.7)
NEUTROPHILS # BLD AUTO: 10.21 X10(3) UL (ref 1.5–7.7)
NEUTROPHILS NFR BLD AUTO: 90.7 %
OSMOLALITY SERPL CALC.SUM OF ELEC: 294 MOSM/KG (ref 275–295)
PLATELET # BLD AUTO: 117 10(3)UL (ref 150–450)
POTASSIUM SERPL-SCNC: 4 MMOL/L (ref 3.5–5.1)
PROT SERPL-MCNC: 4.6 G/DL (ref 6.4–8.2)
RBC # BLD AUTO: 3.18 X10(6)UL
SODIUM SERPL-SCNC: 141 MMOL/L (ref 136–145)
WBC # BLD AUTO: 11.3 X10(3) UL (ref 4–11)

## 2022-05-21 PROCEDURE — 71045 X-RAY EXAM CHEST 1 VIEW: CPT | Performed by: INTERNAL MEDICINE

## 2022-05-21 PROCEDURE — 99233 SBSQ HOSP IP/OBS HIGH 50: CPT | Performed by: HOSPITALIST

## 2022-05-21 PROCEDURE — 99232 SBSQ HOSP IP/OBS MODERATE 35: CPT | Performed by: INTERNAL MEDICINE

## 2022-05-21 RX ORDER — DEXTROSE, SODIUM CHLORIDE, AND POTASSIUM CHLORIDE 5; .45; .15 G/100ML; G/100ML; G/100ML
INJECTION INTRAVENOUS CONTINUOUS
Status: DISCONTINUED | OUTPATIENT
Start: 2022-05-21 | End: 2022-05-25

## 2022-05-21 RX ORDER — LORAZEPAM 2 MG/ML
1 INJECTION INTRAMUSCULAR ONCE
Status: DISCONTINUED | OUTPATIENT
Start: 2022-05-21 | End: 2022-05-23

## 2022-05-21 RX ORDER — FAMOTIDINE 10 MG/ML
20 INJECTION, SOLUTION INTRAVENOUS DAILY
Status: DISCONTINUED | OUTPATIENT
Start: 2022-05-21 | End: 2022-05-30

## 2022-05-21 RX ORDER — HEPARIN SODIUM 5000 [USP'U]/ML
5000 INJECTION, SOLUTION INTRAVENOUS; SUBCUTANEOUS EVERY 12 HOURS
Status: DISCONTINUED | OUTPATIENT
Start: 2022-05-21 | End: 2022-06-10

## 2022-05-21 RX ORDER — HYDRALAZINE HYDROCHLORIDE 20 MG/ML
10 INJECTION INTRAMUSCULAR; INTRAVENOUS EVERY 4 HOURS PRN
Status: DISCONTINUED | OUTPATIENT
Start: 2022-05-21 | End: 2022-06-14

## 2022-05-21 RX ORDER — LABETALOL HYDROCHLORIDE 5 MG/ML
10 INJECTION, SOLUTION INTRAVENOUS EVERY 4 HOURS PRN
Status: DISCONTINUED | OUTPATIENT
Start: 2022-05-21 | End: 2022-06-14

## 2022-05-21 RX ORDER — ACETAMINOPHEN 10 MG/ML
1000 INJECTION, SOLUTION INTRAVENOUS EVERY 6 HOURS
Status: DISCONTINUED | OUTPATIENT
Start: 2022-05-21 | End: 2022-05-29

## 2022-05-21 RX ORDER — MORPHINE SULFATE 2 MG/ML
2 INJECTION, SOLUTION INTRAMUSCULAR; INTRAVENOUS EVERY 2 HOUR PRN
Status: DISCONTINUED | OUTPATIENT
Start: 2022-05-19 | End: 2022-06-14

## 2022-05-21 RX ORDER — MORPHINE SULFATE 4 MG/ML
4 INJECTION, SOLUTION INTRAMUSCULAR; INTRAVENOUS EVERY 2 HOUR PRN
Status: DISCONTINUED | OUTPATIENT
Start: 2022-05-19 | End: 2022-06-14

## 2022-05-21 RX ORDER — MORPHINE SULFATE 4 MG/ML
6 INJECTION, SOLUTION INTRAMUSCULAR; INTRAVENOUS EVERY 2 HOUR PRN
Status: DISCONTINUED | OUTPATIENT
Start: 2022-05-19 | End: 2022-06-14

## 2022-05-21 RX ORDER — ONDANSETRON 2 MG/ML
4 INJECTION INTRAMUSCULAR; INTRAVENOUS EVERY 6 HOURS PRN
Status: DISCONTINUED | OUTPATIENT
Start: 2022-05-21 | End: 2022-06-14

## 2022-05-21 NOTE — PROGRESS NOTES
[facility] Pharmacy Note:  Renal Dose Adjustment     Booker has been prescribed famotidine (PEPCID) 20 mg intravenously every 12 hours. Estimated Creatinine Clearance: 38.9 mL/min (A) (based on SCr of 1.35 mg/dL (H)). Calculated creatinine clearance is < 50 ml/min, therefore the dose of famotidine (Pepcid) has been changed to 20 mg intravenously every 24 hours per P&T approved protocol. Pharmacy will continue to follow, and if renal function improves, will resume the original order.     Thank you,  Irvin Moore, PharmD  5/21/2022 2:14 PM

## 2022-05-21 NOTE — PLAN OF CARE
Pt remains to reach out tubes/lines despite frequent rounds and redirection, remains on bilateral soft wrist restraints to prevent pulling out lines and tubes.     Problem: Safety Risk - Non-Violent Restraints  Goal: Patient will remain free from self-harm  Description: INTERVENTIONS:  - Apply the least restrictive restraint to prevent harm  - Notify patient and family of reasons restraints applied  - Assess for any contributing factors to confusion (electrolyte disturbances, delirium, medications)  - Discontinue any unnecessary medical devices as soon as possible  - Assess the patient's physical comfort, circulation, skin condition, hydration, nutrition and elimination needs   - Reorient and redirection as needed  - Assess for the need to continue restraints  Outcome: Not Progressing

## 2022-05-22 PROCEDURE — 99233 SBSQ HOSP IP/OBS HIGH 50: CPT | Performed by: HOSPITALIST

## 2022-05-22 PROCEDURE — 99232 SBSQ HOSP IP/OBS MODERATE 35: CPT | Performed by: INTERNAL MEDICINE

## 2022-05-22 RX ORDER — METOPROLOL TARTRATE 5 MG/5ML
2.5 INJECTION INTRAVENOUS EVERY 6 HOURS
Status: DISCONTINUED | OUTPATIENT
Start: 2022-05-22 | End: 2022-05-29

## 2022-05-23 ENCOUNTER — APPOINTMENT (OUTPATIENT)
Dept: GENERAL RADIOLOGY | Facility: HOSPITAL | Age: 82
End: 2022-05-23
Attending: HOSPITALIST
Payer: MEDICARE

## 2022-05-23 LAB
ANION GAP SERPL CALC-SCNC: 7 MMOL/L (ref 0–18)
BASOPHILS # BLD AUTO: 0.03 X10(3) UL (ref 0–0.2)
BASOPHILS NFR BLD AUTO: 0.3 %
BLOOD TYPE BARCODE: 7300
BUN BLD-MCNC: 23 MG/DL (ref 7–18)
BUN/CREAT SERPL: 18.3 (ref 10–20)
CALCIUM BLD-MCNC: 8.3 MG/DL (ref 8.5–10.1)
CHLORIDE SERPL-SCNC: 115 MMOL/L (ref 98–112)
CO2 SERPL-SCNC: 23 MMOL/L (ref 21–32)
CREAT BLD-MCNC: 1.26 MG/DL
DEPRECATED RDW RBC AUTO: 45.9 FL (ref 35.1–46.3)
EOSINOPHIL # BLD AUTO: 0.12 X10(3) UL (ref 0–0.7)
EOSINOPHIL NFR BLD AUTO: 1.2 %
ERYTHROCYTE [DISTWIDTH] IN BLOOD BY AUTOMATED COUNT: 13.1 % (ref 11–15)
GLUCOSE BLD-MCNC: 69 MG/DL (ref 70–99)
GLUCOSE BLDC GLUCOMTR-MCNC: 100 MG/DL (ref 70–99)
GLUCOSE BLDC GLUCOMTR-MCNC: 160 MG/DL (ref 70–99)
GLUCOSE BLDC GLUCOMTR-MCNC: 57 MG/DL (ref 70–99)
GLUCOSE BLDC GLUCOMTR-MCNC: 58 MG/DL (ref 70–99)
GLUCOSE BLDC GLUCOMTR-MCNC: 68 MG/DL (ref 70–99)
GLUCOSE BLDC GLUCOMTR-MCNC: 96 MG/DL (ref 70–99)
GLUCOSE BLDC GLUCOMTR-MCNC: 98 MG/DL (ref 70–99)
HCT VFR BLD AUTO: 35.6 %
HGB BLD-MCNC: 12 G/DL
IMM GRANULOCYTES # BLD AUTO: 0.4 X10(3) UL (ref 0–1)
IMM GRANULOCYTES NFR BLD: 4 %
LYMPHOCYTES # BLD AUTO: 1.12 X10(3) UL (ref 1–4)
LYMPHOCYTES NFR BLD AUTO: 11.1 %
MAGNESIUM SERPL-MCNC: 2 MG/DL (ref 1.6–2.6)
MCH RBC QN AUTO: 32 PG (ref 26–34)
MCHC RBC AUTO-ENTMCNC: 33.7 G/DL (ref 31–37)
MCV RBC AUTO: 94.9 FL
MONOCYTES # BLD AUTO: 0.73 X10(3) UL (ref 0.1–1)
MONOCYTES NFR BLD AUTO: 7.2 %
NEUTROPHILS # BLD AUTO: 7.72 X10 (3) UL (ref 1.5–7.7)
NEUTROPHILS # BLD AUTO: 7.72 X10(3) UL (ref 1.5–7.7)
NEUTROPHILS NFR BLD AUTO: 76.2 %
NT-PROBNP SERPL-MCNC: 4754 PG/ML (ref ?–450)
OSMOLALITY SERPL CALC.SUM OF ELEC: 302 MOSM/KG (ref 275–295)
PLATELET # BLD AUTO: 151 10(3)UL (ref 150–450)
POTASSIUM SERPL-SCNC: 4 MMOL/L (ref 3.5–5.1)
RBC # BLD AUTO: 3.75 X10(6)UL
SODIUM SERPL-SCNC: 145 MMOL/L (ref 136–145)
WBC # BLD AUTO: 10.1 X10(3) UL (ref 4–11)

## 2022-05-23 PROCEDURE — 99291 CRITICAL CARE FIRST HOUR: CPT | Performed by: INTERNAL MEDICINE

## 2022-05-23 PROCEDURE — 99233 SBSQ HOSP IP/OBS HIGH 50: CPT | Performed by: HOSPITALIST

## 2022-05-23 PROCEDURE — 71045 X-RAY EXAM CHEST 1 VIEW: CPT | Performed by: HOSPITALIST

## 2022-05-23 PROCEDURE — 3E0336Z INTRODUCTION OF NUTRITIONAL SUBSTANCE INTO PERIPHERAL VEIN, PERCUTANEOUS APPROACH: ICD-10-PCS | Performed by: HOSPITALIST

## 2022-05-23 RX ORDER — FUROSEMIDE 10 MG/ML
20 INJECTION INTRAMUSCULAR; INTRAVENOUS ONCE
Status: COMPLETED | OUTPATIENT
Start: 2022-05-23 | End: 2022-05-23

## 2022-05-23 RX ORDER — CALCIUM GLUCONATE 20 MG/ML
2 INJECTION, SOLUTION INTRAVENOUS DAILY
Status: DISCONTINUED | OUTPATIENT
Start: 2022-05-23 | End: 2022-06-08

## 2022-05-23 RX ORDER — DEXTROSE MONOHYDRATE 25 G/50ML
INJECTION, SOLUTION INTRAVENOUS
Status: COMPLETED
Start: 2022-05-23 | End: 2022-05-23

## 2022-05-23 RX ORDER — ALBUTEROL SULFATE 2.5 MG/3ML
2.5 SOLUTION RESPIRATORY (INHALATION)
Status: DISCONTINUED | OUTPATIENT
Start: 2022-05-23 | End: 2022-05-28

## 2022-05-23 NOTE — PLAN OF CARE
BL wrist restraints remain in place to keep pt from pulling on tubes and posey vest in place to remind pt to stay in bed.     Problem: Safety Risk - Non-Violent Restraints  Goal: Patient will remain free from self-harm  Description: INTERVENTIONS:  - Apply the least restrictive restraint to prevent harm  - Notify patient and family of reasons restraints applied  - Assess for any contributing factors to confusion (electrolyte disturbances, delirium, medications)  - Discontinue any unnecessary medical devices as soon as possible  - Assess the patient's physical comfort, circulation, skin condition, hydration, nutrition and elimination needs   - Reorient and redirection as needed  - Assess for the need to continue restraints  5/23/2022 0623 by Sam Hodges RN  Outcome: Progressing  5/23/2022 0041 by Sam Hodges RN  Outcome: Progressing

## 2022-05-23 NOTE — PLAN OF CARE
Problem: ALTERED NUTRIENT INTAKE - ADULT  Goal: Nutrient intake appropriate for improving, restoring or maintaining nutritional needs  Description: INTERVENTIONS:  - Assess nutritional status and recommend course of action  - Monitor oral intake, labs, and treatment plans  - Recommend appropriate diets, oral nutritional supplements, and vitamin/mineral supplements  - Recommend, monitor, and adjust tube feedings and TPN/PPN based on assessed needs  - Provide specific nutrition education as appropriate  5/23/2022 1233 by Mikel Bennett RD  Outcome: Progressing  5/23/2022 1201 by Mikel Bennett RD  Outcome: Not Progressing

## 2022-05-24 ENCOUNTER — APPOINTMENT (OUTPATIENT)
Dept: GENERAL RADIOLOGY | Facility: HOSPITAL | Age: 82
End: 2022-05-24
Attending: INTERNAL MEDICINE
Payer: MEDICARE

## 2022-05-24 LAB
ALBUMIN SERPL-MCNC: 1.5 G/DL (ref 3.4–5)
ALBUMIN/GLOB SERPL: 0.5 {RATIO} (ref 1–2)
ALP LIVER SERPL-CCNC: 48 U/L
ALT SERPL-CCNC: 33 U/L
ANION GAP SERPL CALC-SCNC: 5 MMOL/L (ref 0–18)
AST SERPL-CCNC: 48 U/L (ref 15–37)
BASOPHILS # BLD: 0 X10(3) UL (ref 0–0.2)
BASOPHILS NFR BLD: 0 %
BILIRUB SERPL-MCNC: 1.6 MG/DL (ref 0.1–2)
BUN BLD-MCNC: 25 MG/DL (ref 7–18)
BUN/CREAT SERPL: 18.8 (ref 10–20)
CALCIUM BLD-MCNC: 8.1 MG/DL (ref 8.5–10.1)
CHLORIDE SERPL-SCNC: 112 MMOL/L (ref 98–112)
CO2 SERPL-SCNC: 25 MMOL/L (ref 21–32)
CREAT BLD-MCNC: 1.33 MG/DL
DEPRECATED RDW RBC AUTO: 46.9 FL (ref 35.1–46.3)
EOSINOPHIL # BLD: 0.2 X10(3) UL (ref 0–0.7)
EOSINOPHIL NFR BLD: 2 %
ERYTHROCYTE [DISTWIDTH] IN BLOOD BY AUTOMATED COUNT: 13.7 % (ref 11–15)
GLOBULIN PLAS-MCNC: 3.2 G/DL (ref 2.8–4.4)
GLUCOSE BLD-MCNC: 115 MG/DL (ref 70–99)
GLUCOSE BLDC GLUCOMTR-MCNC: 104 MG/DL (ref 70–99)
GLUCOSE BLDC GLUCOMTR-MCNC: 109 MG/DL (ref 70–99)
GLUCOSE BLDC GLUCOMTR-MCNC: 90 MG/DL (ref 70–99)
HCT VFR BLD AUTO: 30.9 %
HGB BLD-MCNC: 10.5 G/DL
LYMPHOCYTES NFR BLD: 1.01 X10(3) UL (ref 1–4)
LYMPHOCYTES NFR BLD: 10 %
MAGNESIUM SERPL-MCNC: 2 MG/DL (ref 1.6–2.6)
MCH RBC QN AUTO: 31.9 PG (ref 26–34)
MCHC RBC AUTO-ENTMCNC: 34 G/DL (ref 31–37)
MCV RBC AUTO: 93.9 FL
MONOCYTES # BLD: 0.71 X10(3) UL (ref 0.1–1)
MONOCYTES NFR BLD: 7 %
MORPHOLOGY: NORMAL
NEUTROPHILS # BLD AUTO: 6.83 X10 (3) UL (ref 1.5–7.7)
NEUTROPHILS NFR BLD: 79 %
NEUTS BAND NFR BLD: 2 %
NEUTS HYPERSEG # BLD: 8.18 X10(3) UL (ref 1.5–7.7)
NRBC BLD MANUAL-RTO: 1 %
OSMOLALITY SERPL CALC.SUM OF ELEC: 299 MOSM/KG (ref 275–295)
PHOSPHATE SERPL-MCNC: 2.7 MG/DL (ref 2.5–4.9)
PLATELET # BLD AUTO: 164 10(3)UL (ref 150–450)
PLATELET MORPHOLOGY: NORMAL
POTASSIUM SERPL-SCNC: 3.5 MMOL/L (ref 3.5–5.1)
PROT SERPL-MCNC: 4.7 G/DL (ref 6.4–8.2)
RBC # BLD AUTO: 3.29 X10(6)UL
SODIUM SERPL-SCNC: 142 MMOL/L (ref 136–145)
TOTAL CELLS COUNTED BLD: 100
WBC # BLD AUTO: 10.1 X10(3) UL (ref 4–11)

## 2022-05-24 PROCEDURE — 99232 SBSQ HOSP IP/OBS MODERATE 35: CPT | Performed by: INTERNAL MEDICINE

## 2022-05-24 PROCEDURE — 71045 X-RAY EXAM CHEST 1 VIEW: CPT | Performed by: INTERNAL MEDICINE

## 2022-05-24 PROCEDURE — 99233 SBSQ HOSP IP/OBS HIGH 50: CPT | Performed by: HOSPITALIST

## 2022-05-24 NOTE — PLAN OF CARE
Bilateral soft wrist restraints in place for NG tube patient compliance and safety.     Problem: Safety Risk - Non-Violent Restraints  Goal: Patient will remain free from self-harm  Description: INTERVENTIONS:  - Apply the least restrictive restraint to prevent harm  - Notify patient and family of reasons restraints applied  - Assess for any contributing factors to confusion (electrolyte disturbances, delirium, medications)  - Discontinue any unnecessary medical devices as soon as possible  - Assess the patient's physical comfort, circulation, skin condition, hydration, nutrition and elimination needs   - Reorient and redirection as needed  - Assess for the need to continue restraints  Outcome: Progressing

## 2022-05-24 NOTE — PLAN OF CARE
Problem: Safety Risk - Non-Violent Restraints  Goal: Patient will remain free from self-harm  Description: INTERVENTIONS:  - Apply the least restrictive restraint to prevent harm  - Notify patient and family of reasons restraints applied  - Assess for any contributing factors to confusion (electrolyte disturbances, delirium, medications)  - Discontinue any unnecessary medical devices as soon as possible  - Assess the patient's physical comfort, circulation, skin condition, hydration, nutrition and elimination needs   - Reorient and redirection as needed  - Assess for the need to continue restraints  5/24/2022 1730 by Rocio Rosenberg RN  Outcome: Completed

## 2022-05-24 NOTE — CM/SW NOTE
Department  notified of request for antoni [facility] referrals started. Assigned CM/SW to follow up with pt/family on further discharge planning.      Adelina Cables

## 2022-05-24 NOTE — CM/SW NOTE
05/24/22 1600   CM/SW Referral Data   Referral Source Social Work (self-referral)   Reason for Referral Discharge planning   Informant Spouse/Significant Other   Pertinent Medical Hx   Does patient have an established PCP? Yes   Patient Info   Patient's Current Mental Status at Time of Assessment Confused or unable to complete assessment;Memory Impairments   Patient's [address]   Number of Levels in Home 1   Number of Stair in Home 4   Patient lives with Spouse/Significant other;Grandchild   Patient Status Prior to Admission   Independent with ADLs and Mobility No   Pt. requires assistance with Driving   Discharge Needs   Anticipated D/C needs To be determined;Subacute rehab   Choice of Post-Acute Provider   Informed patient of right to choose their preferred provider Yes     SW initiated self referral for DC planning. SW met with pt, spouse and his sister at bedside. Pt unable to participate in assessment: HX of dementia. Per spouse, pta pt was primarily independent. Pt does not utilize any DME. No HX of HH or [facility]. Pt does not drive. Pt lives at home with spouse and 3 grandchildren. Pt's sister lives in area. Pt has strong social support. SW requested for pt/ot to re-eval pt. PT/OT 2 days ago with rec for [facility]. SW provided education on [facility] referral process. Spouse and sister interested in referrals to be sent and to review a list. Pt will require new therapy notes for insurance auth purposes. PLAN: pending medical course and insurance auth - [facility]  Need:  - list of facilities  - choice of [facility]  - pasrr    SW remains available for support and/or discharge planning. Please do not hesitate to call/chat SW if further DC needs arise.      Delroy De La O MS, [facility]  Ext [phone]

## 2022-05-24 NOTE — PROGRESS NOTES
05/24/22 0825   Negative Pressure Wound Therapy Abdomen Mid   Placement Date/Time: 05/23/22 0945   Inserted by: Juan Farrell RN  Wound Type: Surgical  Location: Abdomen  Wound Location Orientation: Mid   Wound photographed/measured No   Machine Status (On) Yes   Site Assessment ATA   Unit Type KCI ULTA   Cycle Continuous; On   Target Pressure (mmHg) 125   Drainage Description Other (Comment)  (none)   Dressing Status Intact   Output (mL) 0 mL   Wound Follow Up   Follow up needed Yes

## 2022-05-25 LAB
ANION GAP SERPL CALC-SCNC: 6 MMOL/L (ref 0–18)
BASOPHILS # BLD: 0 X10(3) UL (ref 0–0.2)
BASOPHILS NFR BLD: 0 %
BILIRUB UR QL: NEGATIVE
BUN BLD-MCNC: 17 MG/DL (ref 7–18)
BUN/CREAT SERPL: 16.8 (ref 10–20)
CALCIUM BLD-MCNC: 8.1 MG/DL (ref 8.5–10.1)
CHLORIDE SERPL-SCNC: 109 MMOL/L (ref 98–112)
CLARITY UR: CLEAR
CO2 SERPL-SCNC: 25 MMOL/L (ref 21–32)
COLOR UR: YELLOW
CREAT BLD-MCNC: 1.01 MG/DL
DEPRECATED RDW RBC AUTO: 46.8 FL (ref 35.1–46.3)
EOSINOPHIL # BLD: 0 X10(3) UL (ref 0–0.7)
EOSINOPHIL NFR BLD: 0 %
ERYTHROCYTE [DISTWIDTH] IN BLOOD BY AUTOMATED COUNT: 13.6 % (ref 11–15)
GLUCOSE BLD-MCNC: 137 MG/DL (ref 70–99)
GLUCOSE BLDC GLUCOMTR-MCNC: 122 MG/DL (ref 70–99)
GLUCOSE BLDC GLUCOMTR-MCNC: 130 MG/DL (ref 70–99)
GLUCOSE BLDC GLUCOMTR-MCNC: 136 MG/DL (ref 70–99)
GLUCOSE BLDC GLUCOMTR-MCNC: 147 MG/DL (ref 70–99)
GLUCOSE UR-MCNC: NEGATIVE MG/DL
HCT VFR BLD AUTO: 31.7 %
HGB BLD-MCNC: 10.9 G/DL
KETONES UR-MCNC: NEGATIVE MG/DL
LEUKOCYTE ESTERASE UR QL STRIP.AUTO: NEGATIVE
LYMPHOCYTES NFR BLD: 0.67 X10(3) UL (ref 1–4)
LYMPHOCYTES NFR BLD: 5 %
MAGNESIUM SERPL-MCNC: 2.2 MG/DL (ref 1.6–2.6)
MCH RBC QN AUTO: 31.8 PG (ref 26–34)
MCHC RBC AUTO-ENTMCNC: 34.4 G/DL (ref 31–37)
MCV RBC AUTO: 92.4 FL
MONOCYTES # BLD: 1.21 X10(3) UL (ref 0.1–1)
MONOCYTES NFR BLD: 9 %
MORPHOLOGY: NORMAL
NEUTROPHILS # BLD AUTO: 8.42 X10 (3) UL (ref 1.5–7.7)
NEUTROPHILS NFR BLD: 83 %
NEUTS BAND NFR BLD: 3 %
NEUTS HYPERSEG # BLD: 11.52 X10(3) UL (ref 1.5–7.7)
NITRITE UR QL STRIP.AUTO: NEGATIVE
OSMOLALITY SERPL CALC.SUM OF ELEC: 294 MOSM/KG (ref 275–295)
PH UR: 7 [PH] (ref 5–8)
PHOSPHATE SERPL-MCNC: 2.2 MG/DL (ref 2.5–4.9)
PLATELET # BLD AUTO: 222 10(3)UL (ref 150–450)
PLATELET MORPHOLOGY: NORMAL
POTASSIUM SERPL-SCNC: 3.1 MMOL/L (ref 3.5–5.1)
POTASSIUM SERPL-SCNC: 3.3 MMOL/L (ref 3.5–5.1)
PROT UR-MCNC: 30 MG/DL
RBC # BLD AUTO: 3.43 X10(6)UL
RBC #/AREA URNS AUTO: >10 /HPF
SODIUM SERPL-SCNC: 140 MMOL/L (ref 136–145)
SP GR UR STRIP: 1.01 (ref 1–1.03)
TOTAL CELLS COUNTED BLD: 100
UROBILINOGEN UR STRIP-ACNC: <2
VIT C UR-MCNC: NEGATIVE MG/DL
WBC # BLD AUTO: 13.4 X10(3) UL (ref 4–11)

## 2022-05-25 PROCEDURE — 99232 SBSQ HOSP IP/OBS MODERATE 35: CPT | Performed by: INTERNAL MEDICINE

## 2022-05-25 PROCEDURE — 99233 SBSQ HOSP IP/OBS HIGH 50: CPT | Performed by: HOSPITALIST

## 2022-05-25 RX ORDER — POTASSIUM CHLORIDE 14.9 MG/ML
20 INJECTION INTRAVENOUS ONCE
Status: COMPLETED | OUTPATIENT
Start: 2022-05-25 | End: 2022-05-25

## 2022-05-25 NOTE — PROGRESS NOTES
Double RN skin check done prior to transfer off Unit. Skin check performed by this RN and  Sarah Arana. Wounds are as follows: abdominal midline incision with wound vac. Will remain available for any further questions or concerns.

## 2022-05-25 NOTE — CM/SW NOTE
PT/OT to see pt today. [facility] referrals send yesterday and are currently pending. PASRR completed today 5/25 and uploaded into [employer]. PLAN: pending medical course & insurance auth - [facility]  Need:  - list of [facility] facilities  - choice    SW remains available for support and/or discharge planning. Please do not hesitate to call/chat SW if further DC needs arise.      Vincenzo Mac [facility], [facility]  Ext [phone]

## 2022-05-25 NOTE — PROGRESS NOTES
05/25/22 0940   Wound 05/19/22 Incision Abdomen Medial;Upper   Date First Assessed/Time First Assessed: 05/19/22 1209   Primary Wound Type: Incision  Location: Abdomen  Wound Location Orientation: Medial;Upper   Wound Image    Site Assessment Clean;Fragile;Granulation tissue; Moist;Painful;Red   Closure Not approximated   Drainage Amount Scant   Drainage Description Serosanguineous   Treatments Cleansed; Saline/Wound ; Wound Vac - Neg Pressure   Dressing Wound vac sponge   Dressing Changed Changed   Dressing Status Dressing Changed;Removed; Old drainage   Non-staged Wound Description Full thickness   Celia-wound Assessment Clean;Dry; Intact   Wound Granulation Tissue Red   Wound Odor None   State of Healing Early/partial granulation   Negative Pressure Wound Therapy Abdomen Mid   Placement Date/Time: 05/23/22 0945   Inserted by: Mary Teixeira RN  Wound Type: Surgical  Location: Abdomen  Wound Location Orientation: Mid   Wound photographed/measured Yes   Machine Status (On) Yes   Site Assessment Clean;Fragile;Granulation tissue; Moist;Painful;Red   Celia-wound Assessment Clean;Dry; Intact   Unit Type KCI ULTA   Dressing Type Silver impregnated foam   Number of Foam Pieces Used 1   Cycle Continuous; On   Target Pressure (mmHg) 125   Drainage Description Serosanguineous   Dressing Status Dressing Changed;Removed; Old drainage   Canister Changed No   Wound Follow Up   Follow up needed Yes   pt seen for wound vac change. Plan is for discharge to rehab. No home vac has been ordered. Next vac dressing change is due on Friday.

## 2022-05-26 ENCOUNTER — APPOINTMENT (OUTPATIENT)
Dept: PICC SERVICES | Facility: HOSPITAL | Age: 82
End: 2022-05-26
Attending: HOSPITALIST
Payer: MEDICARE

## 2022-05-26 LAB
ALBUMIN SERPL-MCNC: 1.7 G/DL (ref 3.4–5)
ANION GAP SERPL CALC-SCNC: 6 MMOL/L (ref 0–18)
ANION GAP SERPL CALC-SCNC: 6 MMOL/L (ref 0–18)
BASOPHILS # BLD: 0 X10(3) UL (ref 0–0.2)
BASOPHILS NFR BLD: 0 %
BUN BLD-MCNC: 10 MG/DL (ref 7–18)
BUN BLD-MCNC: 10 MG/DL (ref 7–18)
BUN/CREAT SERPL: 11.1 (ref 10–20)
BUN/CREAT SERPL: 11.1 (ref 10–20)
C DIFF TOX B STL QL: POSITIVE
CALCIUM BLD-MCNC: 8.2 MG/DL (ref 8.5–10.1)
CALCIUM BLD-MCNC: 8.2 MG/DL (ref 8.5–10.1)
CHLORIDE SERPL-SCNC: 110 MMOL/L (ref 98–112)
CHLORIDE SERPL-SCNC: 110 MMOL/L (ref 98–112)
CO2 SERPL-SCNC: 26 MMOL/L (ref 21–32)
CO2 SERPL-SCNC: 26 MMOL/L (ref 21–32)
CREAT BLD-MCNC: 0.9 MG/DL
CREAT BLD-MCNC: 0.9 MG/DL
DEPRECATED RDW RBC AUTO: 46.8 FL (ref 35.1–46.3)
EOSINOPHIL # BLD: 0 X10(3) UL (ref 0–0.7)
EOSINOPHIL NFR BLD: 0 %
ERYTHROCYTE [DISTWIDTH] IN BLOOD BY AUTOMATED COUNT: 13.6 % (ref 11–15)
GLUCOSE BLD-MCNC: 130 MG/DL (ref 70–99)
GLUCOSE BLD-MCNC: 130 MG/DL (ref 70–99)
GLUCOSE BLDC GLUCOMTR-MCNC: 123 MG/DL (ref 70–99)
GLUCOSE BLDC GLUCOMTR-MCNC: 128 MG/DL (ref 70–99)
GLUCOSE BLDC GLUCOMTR-MCNC: 131 MG/DL (ref 70–99)
GLUCOSE BLDC GLUCOMTR-MCNC: 84 MG/DL (ref 70–99)
HCT VFR BLD AUTO: 30 %
HGB BLD-MCNC: 10.2 G/DL
LYMPHOCYTES NFR BLD: 1.22 X10(3) UL (ref 1–4)
LYMPHOCYTES NFR BLD: 9 %
MAGNESIUM SERPL-MCNC: 2.2 MG/DL (ref 1.6–2.6)
MCH RBC QN AUTO: 31.9 PG (ref 26–34)
MCHC RBC AUTO-ENTMCNC: 34 G/DL (ref 31–37)
MCV RBC AUTO: 93.8 FL
METAMYELOCYTES # BLD: 0.14 X10(3) UL
METAMYELOCYTES NFR BLD: 1 %
MONOCYTES # BLD: 0.95 X10(3) UL (ref 0.1–1)
MONOCYTES NFR BLD: 7 %
MORPHOLOGY: NORMAL
NEUTROPHILS # BLD AUTO: 9.44 X10 (3) UL (ref 1.5–7.7)
NEUTROPHILS NFR BLD: 83 %
NEUTS HYPERSEG # BLD: 11.21 X10(3) UL (ref 1.5–7.7)
OSMOLALITY SERPL CALC.SUM OF ELEC: 295 MOSM/KG (ref 275–295)
OSMOLALITY SERPL CALC.SUM OF ELEC: 295 MOSM/KG (ref 275–295)
PHOSPHATE SERPL-MCNC: 2.6 MG/DL (ref 2.5–4.9)
PHOSPHATE SERPL-MCNC: 2.6 MG/DL (ref 2.5–4.9)
PLATELET # BLD AUTO: 250 10(3)UL (ref 150–450)
PLATELET MORPHOLOGY: NORMAL
POTASSIUM SERPL-SCNC: 3.7 MMOL/L (ref 3.5–5.1)
RBC # BLD AUTO: 3.2 X10(6)UL
SODIUM SERPL-SCNC: 142 MMOL/L (ref 136–145)
SODIUM SERPL-SCNC: 142 MMOL/L (ref 136–145)
TOTAL CELLS COUNTED BLD: 100
WBC # BLD AUTO: 13.5 X10(3) UL (ref 4–11)

## 2022-05-26 PROCEDURE — 99233 SBSQ HOSP IP/OBS HIGH 50: CPT | Performed by: HOSPITALIST

## 2022-05-26 RX ORDER — METRONIDAZOLE 500 MG/100ML
500 INJECTION, SOLUTION INTRAVENOUS EVERY 8 HOURS
Status: DISCONTINUED | OUTPATIENT
Start: 2022-05-27 | End: 2022-05-29

## 2022-05-26 NOTE — VASCULAR ACCESS
Here to assess PICC line for no blood return. Line noted to be pulled back 8 cm out. Both ports flushing easily and have excellent blood return.  May continue to use for TPN

## 2022-05-26 NOTE — CM/SW NOTE
SW met with pt and spouse at bedside. SW provided spouse with list of available [facility] facilities for review. SW provided further education regarding referral process and [facility] LOC. SW provided spouse with direct call back for writer. Spouse to review and f/up with SW with choice of facility. Per RN rounds, pt remains NPO with TPN infusing. Per RN, anticipating speech to re-eval pt today. SW sent clinical updates to [facility]. Per RN, restraints have been discontinued. RN confirmed that pt will require wound vac at [facility]. SW notified each pending facility of this and included wound care note. @2:20PM  SW met with pt's spouse at bedside to discuss facility choice. SW informed spouse that [facility] is no longer a choice d/t wound vac. Per spouse, [facility] preference. [facility] reserved facility in aidin and requested for auth to be submitted. PLAN: pending medical course & insurance auth - [facility]    SW remains available for support and/or discharge planning. Please do not hesitate to call/chat SW if further DC needs arise.      Padmaja Price [facility], [facility]  Ext [phone]

## 2022-05-26 NOTE — PROGRESS NOTES
05/26/22 0800   Negative Pressure Wound Therapy Abdomen Mid   Placement Date/Time: 05/23/22 0945   Inserted by: Phuong Lazo RN  Wound Type: Surgical  Location: Abdomen  Wound Location Orientation: Mid   Wound photographed/measured No   Machine Status (On) Yes   Site Assessment ATA   Unit Type KCI ULTA   Cycle Continuous; On   Target Pressure (mmHg) 125   Drainage Description Serosanguineous   Dressing Status Intact   Canister Changed No   Wound Follow Up   Follow up needed Yes

## 2022-05-27 ENCOUNTER — APPOINTMENT (OUTPATIENT)
Dept: GENERAL RADIOLOGY | Facility: HOSPITAL | Age: 82
End: 2022-05-27
Attending: HOSPITALIST
Payer: MEDICARE

## 2022-05-27 LAB
ALBUMIN SERPL-MCNC: 1.9 G/DL (ref 3.4–5)
ANION GAP SERPL CALC-SCNC: 7 MMOL/L (ref 0–18)
ANION GAP SERPL CALC-SCNC: 7 MMOL/L (ref 0–18)
BASOPHILS # BLD: 0 X10(3) UL (ref 0–0.2)
BASOPHILS NFR BLD: 0 %
BUN BLD-MCNC: 14 MG/DL (ref 7–18)
BUN BLD-MCNC: 14 MG/DL (ref 7–18)
BUN/CREAT SERPL: 15.2 (ref 10–20)
BUN/CREAT SERPL: 15.2 (ref 10–20)
CALCIUM BLD-MCNC: 8.5 MG/DL (ref 8.5–10.1)
CALCIUM BLD-MCNC: 8.5 MG/DL (ref 8.5–10.1)
CHLORIDE SERPL-SCNC: 105 MMOL/L (ref 98–112)
CHLORIDE SERPL-SCNC: 105 MMOL/L (ref 98–112)
CO2 SERPL-SCNC: 26 MMOL/L (ref 21–32)
CO2 SERPL-SCNC: 26 MMOL/L (ref 21–32)
CREAT BLD-MCNC: 0.92 MG/DL
CREAT BLD-MCNC: 0.92 MG/DL
DEPRECATED RDW RBC AUTO: 50 FL (ref 35.1–46.3)
EOSINOPHIL # BLD: 0.31 X10(3) UL (ref 0–0.7)
EOSINOPHIL NFR BLD: 2 %
ERYTHROCYTE [DISTWIDTH] IN BLOOD BY AUTOMATED COUNT: 14.1 % (ref 11–15)
GLUCOSE BLD-MCNC: 122 MG/DL (ref 70–99)
GLUCOSE BLD-MCNC: 122 MG/DL (ref 70–99)
GLUCOSE BLDC GLUCOMTR-MCNC: 112 MG/DL (ref 70–99)
GLUCOSE BLDC GLUCOMTR-MCNC: 118 MG/DL (ref 70–99)
GLUCOSE BLDC GLUCOMTR-MCNC: 137 MG/DL (ref 70–99)
GLUCOSE BLDC GLUCOMTR-MCNC: 137 MG/DL (ref 70–99)
GLUCOSE BLDC GLUCOMTR-MCNC: 145 MG/DL (ref 70–99)
HCT VFR BLD AUTO: 32.7 %
HGB BLD-MCNC: 10.7 G/DL
LYMPHOCYTES NFR BLD: 1.54 X10(3) UL (ref 1–4)
LYMPHOCYTES NFR BLD: 10 %
MAGNESIUM SERPL-MCNC: 2.4 MG/DL (ref 1.6–2.6)
MCH RBC QN AUTO: 31.5 PG (ref 26–34)
MCHC RBC AUTO-ENTMCNC: 32.7 G/DL (ref 31–37)
MCV RBC AUTO: 96.2 FL
MONOCYTES # BLD: 1.23 X10(3) UL (ref 0.1–1)
MONOCYTES NFR BLD: 8 %
MORPHOLOGY: NORMAL
NEUTROPHILS # BLD AUTO: 10.92 X10 (3) UL (ref 1.5–7.7)
NEUTROPHILS NFR BLD: 78 %
NEUTS BAND NFR BLD: 2 %
NEUTS HYPERSEG # BLD: 12.32 X10(3) UL (ref 1.5–7.7)
OSMOLALITY SERPL CALC.SUM OF ELEC: 288 MOSM/KG (ref 275–295)
OSMOLALITY SERPL CALC.SUM OF ELEC: 288 MOSM/KG (ref 275–295)
PHOSPHATE SERPL-MCNC: 3.2 MG/DL (ref 2.5–4.9)
PHOSPHATE SERPL-MCNC: 3.2 MG/DL (ref 2.5–4.9)
PLATELET # BLD AUTO: 351 10(3)UL (ref 150–450)
PLATELET MORPHOLOGY: NORMAL
POTASSIUM SERPL-SCNC: 4.5 MMOL/L (ref 3.5–5.1)
POTASSIUM SERPL-SCNC: 4.5 MMOL/L (ref 3.5–5.1)
RBC # BLD AUTO: 3.4 X10(6)UL
SODIUM SERPL-SCNC: 138 MMOL/L (ref 136–145)
SODIUM SERPL-SCNC: 138 MMOL/L (ref 136–145)
TOTAL CELLS COUNTED BLD: 100
WBC # BLD AUTO: 15.4 X10(3) UL (ref 4–11)

## 2022-05-27 PROCEDURE — 99233 SBSQ HOSP IP/OBS HIGH 50: CPT | Performed by: HOSPITALIST

## 2022-05-27 PROCEDURE — 74230 X-RAY XM SWLNG FUNCJ C+: CPT | Performed by: HOSPITALIST

## 2022-05-27 RX ORDER — MEMANTINE HYDROCHLORIDE 5 MG/1
5 TABLET ORAL 2 TIMES DAILY
Status: DISCONTINUED | OUTPATIENT
Start: 2022-05-27 | End: 2022-06-14

## 2022-05-27 RX ORDER — FINASTERIDE 5 MG/1
5 TABLET, FILM COATED ORAL DAILY
Status: DISCONTINUED | OUTPATIENT
Start: 2022-05-27 | End: 2022-06-11

## 2022-05-27 RX ORDER — GABAPENTIN 300 MG/1
300 CAPSULE ORAL 2 TIMES DAILY
Status: DISCONTINUED | OUTPATIENT
Start: 2022-05-27 | End: 2022-06-11

## 2022-05-27 RX ORDER — ATORVASTATIN CALCIUM 20 MG/1
20 TABLET, FILM COATED ORAL NIGHTLY
Status: DISCONTINUED | OUTPATIENT
Start: 2022-05-27 | End: 2022-06-14

## 2022-05-27 RX ORDER — LOSARTAN POTASSIUM 50 MG/1
50 TABLET ORAL 2 TIMES DAILY
Status: DISCONTINUED | OUTPATIENT
Start: 2022-05-27 | End: 2022-06-14

## 2022-05-27 RX ORDER — AMLODIPINE BESYLATE 5 MG/1
5 TABLET ORAL DAILY
Status: DISCONTINUED | OUTPATIENT
Start: 2022-05-27 | End: 2022-06-14

## 2022-05-27 RX ORDER — TAMSULOSIN HYDROCHLORIDE 0.4 MG/1
0.4 CAPSULE ORAL 2 TIMES DAILY
Status: DISCONTINUED | OUTPATIENT
Start: 2022-05-27 | End: 2022-06-14

## 2022-05-27 NOTE — PROGRESS NOTES
05/27/22 1400   Wound 05/19/22 Incision Abdomen Medial;Upper   Date First Assessed/Time First Assessed: 05/19/22 1209   Primary Wound Type: Incision  Location: Abdomen  Wound Location Orientation: Medial;Upper   Site Assessment Clean;Fragile;Granulation tissue; Moist;Painful;Red   Closure Approximated   Drainage Amount Scant   Drainage Description Serosanguineous   Treatments Cleansed; Saline/Wound    Dressing Wound vac sponge   Dressing Changed Changed   Dressing Status Removed   Non-staged Wound Description Full thickness   Celia-wound Assessment Clean;Dry; Intact   Wound Granulation Tissue Red   Wound Odor None   Wound Vac Brand KCI   State of Healing Early/partial granulation   Negative Pressure Wound Therapy Abdomen Mid   Placement Date/Time: 05/23/22 0945   Inserted by: Reuben Clarke RN  Wound Type: Surgical  Location: Abdomen  Wound Location Orientation: Mid   Wound photographed/measured No   Machine Status (On) Yes   Site Assessment Clean;Fragile;Granulation tissue; Moist;Painful;Red   Celia-wound Assessment Clean;Dry; Intact   Unit Type KCI ULTA   Dressing Type Silver impregnated foam   Number of Foam Pieces Used 1   Cycle Continuous; On   Target Pressure (mmHg) 125   Drainage Description Serosanguineous   Dressing Status Dressing Changed;Removed   Canister Changed No   Wound Follow Up   Follow up needed Yes   pt seen for wound vac change. Pt had previously pulled off vac dressing and bedside RN placed moist to dry and an abdominal binder so that pt would not remove dressing from abdomen. Pt is extremely confused. One pc silver sponge applied into surgical abdominal wound, vac placed on 125 and a patent seal was detected. If pt is discharged over the weekend, nursing is to follow protocol orders and remove vac and place a moist to dry dressing.  Wound services will not be here on Monday due to the holiday, vac will be changed Tuesday if pt remains in the hospital.

## 2022-05-28 LAB
ALBUMIN SERPL-MCNC: 1.9 G/DL (ref 3.4–5)
ALBUMIN SERPL-MCNC: 1.9 G/DL (ref 3.4–5)
ALBUMIN/GLOB SERPL: 0.5 {RATIO} (ref 1–2)
ALP LIVER SERPL-CCNC: 106 U/L
ALT SERPL-CCNC: 33 U/L
ANION GAP SERPL CALC-SCNC: 7 MMOL/L (ref 0–18)
ANION GAP SERPL CALC-SCNC: 7 MMOL/L (ref 0–18)
AST SERPL-CCNC: 23 U/L (ref 15–37)
BASOPHILS # BLD: 0 X10(3) UL (ref 0–0.2)
BASOPHILS NFR BLD: 0 %
BILIRUB SERPL-MCNC: 0.7 MG/DL (ref 0.1–2)
BUN BLD-MCNC: 17 MG/DL (ref 7–18)
BUN BLD-MCNC: 17 MG/DL (ref 7–18)
BUN/CREAT SERPL: 18.7 (ref 10–20)
BUN/CREAT SERPL: 18.7 (ref 10–20)
CALCIUM BLD-MCNC: 8.7 MG/DL (ref 8.5–10.1)
CALCIUM BLD-MCNC: 8.7 MG/DL (ref 8.5–10.1)
CHLORIDE SERPL-SCNC: 106 MMOL/L (ref 98–112)
CHLORIDE SERPL-SCNC: 106 MMOL/L (ref 98–112)
CO2 SERPL-SCNC: 27 MMOL/L (ref 21–32)
CO2 SERPL-SCNC: 27 MMOL/L (ref 21–32)
CREAT BLD-MCNC: 0.91 MG/DL
CREAT BLD-MCNC: 0.91 MG/DL
DEPRECATED RDW RBC AUTO: 50.4 FL (ref 35.1–46.3)
EOSINOPHIL # BLD: 0.76 X10(3) UL (ref 0–0.7)
EOSINOPHIL NFR BLD: 4 %
ERYTHROCYTE [DISTWIDTH] IN BLOOD BY AUTOMATED COUNT: 14.4 % (ref 11–15)
GLOBULIN PLAS-MCNC: 4.1 G/DL (ref 2.8–4.4)
GLUCOSE BLD-MCNC: 127 MG/DL (ref 70–99)
GLUCOSE BLD-MCNC: 127 MG/DL (ref 70–99)
GLUCOSE BLDC GLUCOMTR-MCNC: 126 MG/DL (ref 70–99)
GLUCOSE BLDC GLUCOMTR-MCNC: 148 MG/DL (ref 70–99)
HCT VFR BLD AUTO: 31.9 %
HGB BLD-MCNC: 10.4 G/DL
LYMPHOCYTES NFR BLD: 0.57 X10(3) UL (ref 1–4)
LYMPHOCYTES NFR BLD: 3 %
MAGNESIUM SERPL-MCNC: 2.6 MG/DL (ref 1.6–2.6)
MCH RBC QN AUTO: 31.3 PG (ref 26–34)
MCHC RBC AUTO-ENTMCNC: 32.6 G/DL (ref 31–37)
MCV RBC AUTO: 96.1 FL
METAMYELOCYTES # BLD: 0.38 X10(3) UL
METAMYELOCYTES NFR BLD: 2 %
MONOCYTES # BLD: 1.13 X10(3) UL (ref 0.1–1)
MONOCYTES NFR BLD: 6 %
MORPHOLOGY: NORMAL
NEUTROPHILS # BLD AUTO: 14.04 X10 (3) UL (ref 1.5–7.7)
NEUTROPHILS NFR BLD: 74 %
NEUTS BAND NFR BLD: 11 %
NEUTS HYPERSEG # BLD: 16.07 X10(3) UL (ref 1.5–7.7)
OSMOLALITY SERPL CALC.SUM OF ELEC: 293 MOSM/KG (ref 275–295)
OSMOLALITY SERPL CALC.SUM OF ELEC: 293 MOSM/KG (ref 275–295)
PHOSPHATE SERPL-MCNC: 3.5 MG/DL (ref 2.5–4.9)
PLATELET # BLD AUTO: 380 10(3)UL (ref 150–450)
PLATELET MORPHOLOGY: NORMAL
POTASSIUM SERPL-SCNC: 4.7 MMOL/L (ref 3.5–5.1)
POTASSIUM SERPL-SCNC: 4.7 MMOL/L (ref 3.5–5.1)
PROT SERPL-MCNC: 6 G/DL (ref 6.4–8.2)
RBC # BLD AUTO: 3.32 X10(6)UL
SODIUM SERPL-SCNC: 140 MMOL/L (ref 136–145)
SODIUM SERPL-SCNC: 140 MMOL/L (ref 136–145)
TOTAL CELLS COUNTED BLD: 100
WBC # BLD AUTO: 18.9 X10(3) UL (ref 4–11)

## 2022-05-28 PROCEDURE — 99233 SBSQ HOSP IP/OBS HIGH 50: CPT | Performed by: HOSPITALIST

## 2022-05-28 RX ORDER — ALBUTEROL SULFATE 2.5 MG/3ML
2.5 SOLUTION RESPIRATORY (INHALATION) EVERY 6 HOURS PRN
Status: DISCONTINUED | OUTPATIENT
Start: 2022-05-28 | End: 2022-06-14

## 2022-05-28 NOTE — PLAN OF CARE
Pt is positive for c-diff, has multiple loose BM, started on flagyl, pt passed video swallow and was stated on diet see order for restrictions, has very poor appetite, pt needs assistance with feeding, wife present at bedside, pt was working with pt today,   Problem: Patient Centered Care  Goal: Patient preferences are identified and integrated in the patient's plan of care  Description: Interventions:  - What would you like us to know as we care for you?  I like to watch sports  - Provide timely, complete, and accurate information to patient/family  - Incorporate patient and family knowledge, values, beliefs, and cultural backgrounds into the planning and delivery of care  - Encourage patient/family to participate in care and decision-making at the level they choose  - Honor patient and family perspectives and choices  Outcome: Progressing

## 2022-05-28 NOTE — PLAN OF CARE
Pt on soft low residue diet, requires assistance with feeding, poor appetite, continue TPN, PICC line both lumen sluggish and positional, no blood return noted, wife at bedside for most of the day, pt has no c/o pain, BM loose, pt incontinent. Problem: Patient Centered Care  Goal: Patient preferences are identified and integrated in the patient's plan of care  Description: Interventions:  - What would you like us to know as we care for you?  I like to watch sports  - Provide timely, complete, and accurate information to patient/family  - Incorporate patient and family knowledge, values, beliefs, and cultural backgrounds into the planning and delivery of care  - Encourage patient/family to participate in care and decision-making at the level they choose  - Honor patient and family perspectives and choices  Outcome: Progressing

## 2022-05-29 ENCOUNTER — APPOINTMENT (OUTPATIENT)
Dept: CT IMAGING | Facility: HOSPITAL | Age: 82
End: 2022-05-29
Attending: SURGERY
Payer: MEDICARE

## 2022-05-29 LAB
ANION GAP SERPL CALC-SCNC: 6 MMOL/L (ref 0–18)
BASOPHILS # BLD: 0.19 X10(3) UL (ref 0–0.2)
BASOPHILS NFR BLD: 1 %
BUN BLD-MCNC: 18 MG/DL (ref 7–18)
BUN/CREAT SERPL: 20.7 (ref 10–20)
CALCIUM BLD-MCNC: 8.6 MG/DL (ref 8.5–10.1)
CHLORIDE SERPL-SCNC: 107 MMOL/L (ref 98–112)
CO2 SERPL-SCNC: 26 MMOL/L (ref 21–32)
CREAT BLD-MCNC: 0.87 MG/DL
DEPRECATED RDW RBC AUTO: 49.6 FL (ref 35.1–46.3)
EOSINOPHIL # BLD: 0.38 X10(3) UL (ref 0–0.7)
EOSINOPHIL NFR BLD: 2 %
ERYTHROCYTE [DISTWIDTH] IN BLOOD BY AUTOMATED COUNT: 14.2 % (ref 11–15)
GLUCOSE BLD-MCNC: 124 MG/DL (ref 70–99)
GLUCOSE BLDC GLUCOMTR-MCNC: 106 MG/DL (ref 70–99)
GLUCOSE BLDC GLUCOMTR-MCNC: 123 MG/DL (ref 70–99)
GLUCOSE BLDC GLUCOMTR-MCNC: 124 MG/DL (ref 70–99)
HCT VFR BLD AUTO: 31.1 %
HGB BLD-MCNC: 10.2 G/DL
LYMPHOCYTES NFR BLD: 0.96 X10(3) UL (ref 1–4)
LYMPHOCYTES NFR BLD: 5 %
MAGNESIUM SERPL-MCNC: 2.2 MG/DL (ref 1.6–2.6)
MCH RBC QN AUTO: 31.5 PG (ref 26–34)
MCHC RBC AUTO-ENTMCNC: 32.8 G/DL (ref 31–37)
MCV RBC AUTO: 96 FL
MONOCYTES # BLD: 0.58 X10(3) UL (ref 0.1–1)
MONOCYTES NFR BLD: 3 %
NEUTROPHILS # BLD AUTO: 14.86 X10 (3) UL (ref 1.5–7.7)
NEUTROPHILS NFR BLD: 89 %
NEUTS HYPERSEG # BLD: 17.09 X10(3) UL (ref 1.5–7.7)
NEUTS HYPERSEG BLD QL SMEAR: PRESENT
OSMOLALITY SERPL CALC.SUM OF ELEC: 291 MOSM/KG (ref 275–295)
PHOSPHATE SERPL-MCNC: 3.3 MG/DL (ref 2.5–4.9)
PLATELET # BLD AUTO: 389 10(3)UL (ref 150–450)
PLATELET MORPHOLOGY: NORMAL
POTASSIUM SERPL-SCNC: 4.4 MMOL/L (ref 3.5–5.1)
RBC # BLD AUTO: 3.24 X10(6)UL
SODIUM SERPL-SCNC: 139 MMOL/L (ref 136–145)
TOTAL CELLS COUNTED BLD: 100
WBC # BLD AUTO: 19.2 X10(3) UL (ref 4–11)

## 2022-05-29 PROCEDURE — 99233 SBSQ HOSP IP/OBS HIGH 50: CPT | Performed by: HOSPITALIST

## 2022-05-29 PROCEDURE — 74177 CT ABD & PELVIS W/CONTRAST: CPT | Performed by: SURGERY

## 2022-05-29 RX ORDER — FUROSEMIDE 10 MG/ML
20 INJECTION INTRAMUSCULAR; INTRAVENOUS ONCE
Status: COMPLETED | OUTPATIENT
Start: 2022-05-29 | End: 2022-05-29

## 2022-05-29 RX ORDER — ACETAMINOPHEN 325 MG/1
650 TABLET ORAL EVERY 6 HOURS PRN
Status: DISCONTINUED | OUTPATIENT
Start: 2022-05-29 | End: 2022-06-14

## 2022-05-30 LAB
ANION GAP SERPL CALC-SCNC: 6 MMOL/L (ref 0–18)
BASOPHILS # BLD: 0.2 X10(3) UL (ref 0–0.2)
BASOPHILS NFR BLD: 1 %
BUN BLD-MCNC: 19 MG/DL (ref 7–18)
BUN/CREAT SERPL: 23.2 (ref 10–20)
CALCIUM BLD-MCNC: 8.6 MG/DL (ref 8.5–10.1)
CHLORIDE SERPL-SCNC: 105 MMOL/L (ref 98–112)
CO2 SERPL-SCNC: 27 MMOL/L (ref 21–32)
CREAT BLD-MCNC: 0.82 MG/DL
DEPRECATED RDW RBC AUTO: 48.6 FL (ref 35.1–46.3)
EOSINOPHIL # BLD: 0 X10(3) UL (ref 0–0.7)
EOSINOPHIL NFR BLD: 0 %
ERYTHROCYTE [DISTWIDTH] IN BLOOD BY AUTOMATED COUNT: 14.2 % (ref 11–15)
GLUCOSE BLD-MCNC: 107 MG/DL (ref 70–99)
GLUCOSE BLDC GLUCOMTR-MCNC: 100 MG/DL (ref 70–99)
GLUCOSE BLDC GLUCOMTR-MCNC: 105 MG/DL (ref 70–99)
GLUCOSE BLDC GLUCOMTR-MCNC: 106 MG/DL (ref 70–99)
GLUCOSE BLDC GLUCOMTR-MCNC: 108 MG/DL (ref 70–99)
GLUCOSE BLDC GLUCOMTR-MCNC: 112 MG/DL (ref 70–99)
GLUCOSE BLDC GLUCOMTR-MCNC: 124 MG/DL (ref 70–99)
HCT VFR BLD AUTO: 29 %
HGB BLD-MCNC: 9.7 G/DL
LYMPHOCYTES NFR BLD: 1.57 X10(3) UL (ref 1–4)
LYMPHOCYTES NFR BLD: 5 %
MAGNESIUM SERPL-MCNC: 2.1 MG/DL (ref 1.6–2.6)
MCH RBC QN AUTO: 31.7 PG (ref 26–34)
MCHC RBC AUTO-ENTMCNC: 33.4 G/DL (ref 31–37)
MCV RBC AUTO: 94.8 FL
METAMYELOCYTES # BLD: 0.39 X10(3) UL
METAMYELOCYTES NFR BLD: 2 %
MONOCYTES # BLD: 1.18 X10(3) UL (ref 0.1–1)
MONOCYTES NFR BLD: 6 %
MYELOCYTES # BLD: 0.2 X10(3) UL
MYELOCYTES NFR BLD: 1 %
NEUTROPHILS # BLD AUTO: 15.02 X10 (3) UL (ref 1.5–7.7)
NEUTROPHILS NFR BLD: 81 %
NEUTS BAND NFR BLD: 1 %
NEUTS HYPERSEG # BLD: 16.07 X10(3) UL (ref 1.5–7.7)
OSMOLALITY SERPL CALC.SUM OF ELEC: 289 MOSM/KG (ref 275–295)
PHOSPHATE SERPL-MCNC: 3.4 MG/DL (ref 2.5–4.9)
PLATELET # BLD AUTO: 445 10(3)UL (ref 150–450)
PLATELET MORPHOLOGY: NORMAL
POTASSIUM SERPL-SCNC: 4.4 MMOL/L (ref 3.5–5.1)
RBC # BLD AUTO: 3.06 X10(6)UL
SODIUM SERPL-SCNC: 138 MMOL/L (ref 136–145)
TOTAL CELLS COUNTED BLD: 100
TRIGL SERPL-MCNC: 59 MG/DL (ref 30–149)
VARIANT LYMPHS NFR BLD MANUAL: 3 %
WBC # BLD AUTO: 19.6 X10(3) UL (ref 4–11)

## 2022-05-30 PROCEDURE — 99233 SBSQ HOSP IP/OBS HIGH 50: CPT | Performed by: INTERNAL MEDICINE

## 2022-05-30 RX ORDER — FAMOTIDINE 20 MG/1
20 TABLET, FILM COATED ORAL 2 TIMES DAILY
Status: DISCONTINUED | OUTPATIENT
Start: 2022-05-30 | End: 2022-06-14

## 2022-05-30 RX ORDER — FAMOTIDINE 20 MG/1
20 TABLET, FILM COATED ORAL DAILY
Status: DISCONTINUED | OUTPATIENT
Start: 2022-05-30 | End: 2022-05-30

## 2022-05-30 NOTE — PROGRESS NOTES
[facility] Pharmacy Note:  Renal Dose Adjustment    Zahira Torres has been receiving renally adjusted  famotidine (PEPCID) 20 mg IV every 24 hours. Renal function improved and back to base line. Estimated Creatinine Clearance: 58 mL/min (based on SCr rounded up to 0.85 mg/dL). Calculated creatinine clearance is > 50 ml/min, therefore the dose of famotidine (Pepcid) has been changed back to 20 mg orally every 12 hours per P&T approved protocol. Pt also on full-liquid diet now so IV changed to PO famotidine. Pharmacy will continue to follow, and if renal function improves, will resume the original order.     Thank you,  Lakia Ruiz, PharmD  5/30/2022 7:49 AM

## 2022-05-31 ENCOUNTER — APPOINTMENT (OUTPATIENT)
Dept: GENERAL RADIOLOGY | Facility: HOSPITAL | Age: 82
End: 2022-05-31
Attending: HOSPITALIST
Payer: MEDICARE

## 2022-05-31 LAB
ANION GAP SERPL CALC-SCNC: 6 MMOL/L (ref 0–18)
BASOPHILS # BLD: 0 X10(3) UL (ref 0–0.2)
BASOPHILS NFR BLD: 0 %
BUN BLD-MCNC: 18 MG/DL (ref 7–18)
BUN/CREAT SERPL: 20.7 (ref 10–20)
CALCIUM BLD-MCNC: 9 MG/DL (ref 8.5–10.1)
CHLORIDE SERPL-SCNC: 104 MMOL/L (ref 98–112)
CO2 SERPL-SCNC: 27 MMOL/L (ref 21–32)
CREAT BLD-MCNC: 0.87 MG/DL
DEPRECATED RDW RBC AUTO: 51 FL (ref 35.1–46.3)
EOSINOPHIL # BLD: 0.17 X10(3) UL (ref 0–0.7)
EOSINOPHIL NFR BLD: 1 %
ERYTHROCYTE [DISTWIDTH] IN BLOOD BY AUTOMATED COUNT: 14.4 % (ref 11–15)
GLUCOSE BLD-MCNC: 120 MG/DL (ref 70–99)
GLUCOSE BLDC GLUCOMTR-MCNC: 113 MG/DL (ref 70–99)
GLUCOSE BLDC GLUCOMTR-MCNC: 83 MG/DL (ref 70–99)
HCT VFR BLD AUTO: 32.3 %
HGB BLD-MCNC: 10.3 G/DL
LYMPHOCYTES NFR BLD: 1.73 X10(3) UL (ref 1–4)
LYMPHOCYTES NFR BLD: 10 %
MAGNESIUM SERPL-MCNC: 2.1 MG/DL (ref 1.6–2.6)
MCH RBC QN AUTO: 31.4 PG (ref 26–34)
MCHC RBC AUTO-ENTMCNC: 31.9 G/DL (ref 31–37)
MCV RBC AUTO: 98.5 FL
MONOCYTES # BLD: 2.08 X10(3) UL (ref 0.1–1)
MONOCYTES NFR BLD: 12 %
NEUTROPHILS # BLD AUTO: 13.71 X10 (3) UL (ref 1.5–7.7)
NEUTROPHILS NFR BLD: 72 %
NEUTS BAND NFR BLD: 5 %
NEUTS HYPERSEG # BLD: 13.32 X10(3) UL (ref 1.5–7.7)
OSMOLALITY SERPL CALC.SUM OF ELEC: 287 MOSM/KG (ref 275–295)
PHOSPHATE SERPL-MCNC: 3.7 MG/DL (ref 2.5–4.9)
PLATELET # BLD AUTO: 508 10(3)UL (ref 150–450)
PLATELET MORPHOLOGY: NORMAL
POTASSIUM SERPL-SCNC: 4.3 MMOL/L (ref 3.5–5.1)
RBC # BLD AUTO: 3.28 X10(6)UL
SODIUM SERPL-SCNC: 137 MMOL/L (ref 136–145)
TOTAL CELLS COUNTED BLD: 100
WBC # BLD AUTO: 17.3 X10(3) UL (ref 4–11)

## 2022-05-31 PROCEDURE — 99233 SBSQ HOSP IP/OBS HIGH 50: CPT | Performed by: HOSPITALIST

## 2022-05-31 PROCEDURE — 71045 X-RAY EXAM CHEST 1 VIEW: CPT | Performed by: HOSPITALIST

## 2022-05-31 NOTE — WOUND PROGRESS NOTE
Wound RN follow up:     Patient seen at bedside with Jairo Bhandari RN present. Patient offered no complaints. Wound was assessed and cleansed and vac changed. (see assessment)  Patient tolerated well. Vac sealed @125. Next change will be Friday 6/3. Will continue to follow inpatient. Call with any further questions.

## 2022-05-31 NOTE — PHYSICAL THERAPY NOTE
Chart reviewed for PT treatment and discussed case with LESLIE Callahan. Patient had wound care/vac changed today and pt has been in pain. WBC trending down today but flexiseal still in place for now due to liquid stools (for skin protection). Will plan to see tomorrow morning to work on ongoing mobility, ,will try to ambulate if appropriate.  RN is aware

## 2022-05-31 NOTE — PROGRESS NOTES
PICC difficult to flush/sluggish blood return for PM RN. Per vascular access RN, power-flush 20mL NS. Confirmed PICC to be ~9cm exposed, more than the 6cm when placed. PICC RN aware of catheter exposure change of previous days. PICC flushed with difficulty/positional, blue tips changed on both lumens. No blood return. Voicemail left for PICC RN with updates. Call returned in PM. PICC nurse will call ~9am 6/1/22 to discuss PICC line with nurse (TPN vs. Oral intake). Until addressed, patient will be a lab draw. Patient noted to have difficulty swallowing morning medications crushed with thickened liquids/pudding (tsp sized spoonful given). Patient instructed to swallow, unable to follow direction, kept food in mouth without swallowing/coughing. Patient spit liquids into basin, no coughing. Stated he wasn't able to swallow. SLP informed, new consult placed per their direction. Swallow evaluation completed - see note.

## 2022-06-01 LAB
ANION GAP SERPL CALC-SCNC: 5 MMOL/L (ref 0–18)
BASOPHILS # BLD: 0 X10(3) UL (ref 0–0.2)
BASOPHILS NFR BLD: 0 %
BUN BLD-MCNC: 20 MG/DL (ref 7–18)
BUN/CREAT SERPL: 21.7 (ref 10–20)
CALCIUM BLD-MCNC: 8.8 MG/DL (ref 8.5–10.1)
CHLORIDE SERPL-SCNC: 104 MMOL/L (ref 98–112)
CO2 SERPL-SCNC: 26 MMOL/L (ref 21–32)
CREAT BLD-MCNC: 0.92 MG/DL
DEPRECATED RDW RBC AUTO: 50.5 FL (ref 35.1–46.3)
EOSINOPHIL # BLD: 0 X10(3) UL (ref 0–0.7)
EOSINOPHIL NFR BLD: 0 %
ERYTHROCYTE [DISTWIDTH] IN BLOOD BY AUTOMATED COUNT: 14.6 % (ref 11–15)
GLUCOSE BLD-MCNC: 125 MG/DL (ref 70–99)
GLUCOSE BLDC GLUCOMTR-MCNC: 101 MG/DL (ref 70–99)
GLUCOSE BLDC GLUCOMTR-MCNC: 115 MG/DL (ref 70–99)
GLUCOSE BLDC GLUCOMTR-MCNC: 122 MG/DL (ref 70–99)
HCT VFR BLD AUTO: 31.4 %
HGB BLD-MCNC: 10.4 G/DL
LYMPHOCYTES NFR BLD: 1.22 X10(3) UL (ref 1–4)
LYMPHOCYTES NFR BLD: 8 %
MAGNESIUM SERPL-MCNC: 2.3 MG/DL (ref 1.6–2.6)
MCH RBC QN AUTO: 32 PG (ref 26–34)
MCHC RBC AUTO-ENTMCNC: 33.1 G/DL (ref 31–37)
MCV RBC AUTO: 96.6 FL
METAMYELOCYTES # BLD: 0.15 X10(3) UL
METAMYELOCYTES NFR BLD: 1 %
MONOCYTES # BLD: 0.61 X10(3) UL (ref 0.1–1)
MONOCYTES NFR BLD: 4 %
NEUTROPHILS # BLD AUTO: 11.58 X10 (3) UL (ref 1.5–7.7)
NEUTROPHILS NFR BLD: 82 %
NEUTS BAND NFR BLD: 5 %
NEUTS HYPERSEG # BLD: 13.31 X10(3) UL (ref 1.5–7.7)
OSMOLALITY SERPL CALC.SUM OF ELEC: 284 MOSM/KG (ref 275–295)
PHOSPHATE SERPL-MCNC: 3.9 MG/DL (ref 2.5–4.9)
PLATELET # BLD AUTO: 583 10(3)UL (ref 150–450)
PLATELET MORPHOLOGY: NORMAL
POTASSIUM SERPL-SCNC: 4.6 MMOL/L (ref 3.5–5.1)
RBC # BLD AUTO: 3.25 X10(6)UL
SODIUM SERPL-SCNC: 135 MMOL/L (ref 136–145)
TOTAL CELLS COUNTED BLD: 100
WBC # BLD AUTO: 15.3 X10(3) UL (ref 4–11)

## 2022-06-01 PROCEDURE — 99233 SBSQ HOSP IP/OBS HIGH 50: CPT | Performed by: INTERNAL MEDICINE

## 2022-06-01 RX ORDER — HYDROCODONE BITARTRATE AND ACETAMINOPHEN 5; 325 MG/1; MG/1
1 TABLET ORAL EVERY 6 HOURS PRN
Status: DISCONTINUED | OUTPATIENT
Start: 2022-06-01 | End: 2022-06-14

## 2022-06-01 NOTE — DIETARY NOTE
Brief Nutrition Note:    Per RN--issues with PICC line, plans for PPN vs TPN starting tonight and continue until decision made regarding PEG. PPN ordered as follows for tonight: 1800ml, 70 g protein, 1050 non protein calories (300 griselda from dextrose and 750 calories from fat)--providing total of 1330 total calories with 70 g protein to meet 83% min calorie and 93% min protein needs. Additives adjusted based on labs below:     NUTRITION PRESCRIPTION:   Estimated Nutrition needs: --dosing wt of 63 kg - wt taken on 5/20  Calories: 6987-6770 calories/day (25-30 calories per kg Current wt)  Protein: 75-94 g protein/day (1.2-1.5  g protein/kg Current wt)  Fluid Needs: 1 mL/kcal or per MD     Recent Labs     05/30/22  0604 05/31/22  0639 06/01/22  0554   * 120* 125*   BUN 19* 18 20*   CREATSERUM 0.82 0.87 0.92   CA 8.6 9.0 8.8   MG 2.1 2.1 2.3    137 135*   K 4.4 4.3 4.6    104 104   CO2 27.0 27.0 26.0   PHOS 3.4 3.7 3.9   OSMOCALC 289 287 284     Available for EN recommendations if PEG placed.      [address] [facility], [address]   Clinical Dietitian [id number]

## 2022-06-01 NOTE — VASCULAR ACCESS
Spoke with MD Thompson about PICC line. \"Hello. I'm reaching out to ask if you know how much longer the patient will need TPN? His current PICC line is out 9 cm and will need to be exchanged if he needs TPN long term. Please let us know what you think. We can pull the PICC and give him a midline or extended leangth PIV if he can do PPN or we will give him a new PICC if he still requires TPN for more that 2 days\". MD Thompson replied that pt will no longer need TPN if he passed his swallow eval.  Followed up with RN and she states that speech will follow up with patient 06/01/2022 and she will let me know what the next step is. LESLIE Callahan.

## 2022-06-01 NOTE — CM/SW NOTE
Received message from Brent Figueroa, liaison for  Home	[facility]-notified that insurance auth approved for [facility]. SW requested auth exp date information, awaiting review and response. PLAN: [facility] [address]. Insurance auth obtained. Pending med clear, TPN plan. SW to follow case. Please notify as needs arise.        Karla Govea, SARMAD, [facility]    [id number]

## 2022-06-02 ENCOUNTER — APPOINTMENT (OUTPATIENT)
Dept: GENERAL RADIOLOGY | Facility: HOSPITAL | Age: 82
End: 2022-06-02
Attending: INTERNAL MEDICINE
Payer: MEDICARE

## 2022-06-02 LAB
ALBUMIN SERPL-MCNC: 1.9 G/DL (ref 3.4–5)
ALP LIVER SERPL-CCNC: 134 U/L
ALT SERPL-CCNC: 55 U/L
ANION GAP SERPL CALC-SCNC: 7 MMOL/L (ref 0–18)
AST SERPL-CCNC: 43 U/L (ref 15–37)
BASOPHILS # BLD: 0 X10(3) UL (ref 0–0.2)
BASOPHILS NFR BLD: 0 %
BILIRUB DIRECT SERPL-MCNC: 0.2 MG/DL (ref 0–0.2)
BILIRUB SERPL-MCNC: 0.7 MG/DL (ref 0.1–2)
BUN BLD-MCNC: 19 MG/DL (ref 7–18)
BUN/CREAT SERPL: 22.9 (ref 10–20)
CALCIUM BLD-MCNC: 8.6 MG/DL (ref 8.5–10.1)
CHLORIDE SERPL-SCNC: 106 MMOL/L (ref 98–112)
CO2 SERPL-SCNC: 22 MMOL/L (ref 21–32)
CREAT BLD-MCNC: 0.83 MG/DL
DEPRECATED RDW RBC AUTO: 51.1 FL (ref 35.1–46.3)
EOSINOPHIL # BLD: 0 X10(3) UL (ref 0–0.7)
EOSINOPHIL NFR BLD: 0 %
ERYTHROCYTE [DISTWIDTH] IN BLOOD BY AUTOMATED COUNT: 14.5 % (ref 11–15)
GLUCOSE BLD-MCNC: 99 MG/DL (ref 70–99)
GLUCOSE BLDC GLUCOMTR-MCNC: 104 MG/DL (ref 70–99)
GLUCOSE BLDC GLUCOMTR-MCNC: 87 MG/DL (ref 70–99)
GLUCOSE BLDC GLUCOMTR-MCNC: 87 MG/DL (ref 70–99)
GLUCOSE BLDC GLUCOMTR-MCNC: 97 MG/DL (ref 70–99)
HCT VFR BLD AUTO: 31.3 %
HGB BLD-MCNC: 10 G/DL
LYMPHOCYTES NFR BLD: 1.3 X10(3) UL (ref 1–4)
LYMPHOCYTES NFR BLD: 10 %
MAGNESIUM SERPL-MCNC: 2.2 MG/DL (ref 1.6–2.6)
MCH RBC QN AUTO: 31.2 PG (ref 26–34)
MCHC RBC AUTO-ENTMCNC: 31.9 G/DL (ref 31–37)
MCV RBC AUTO: 97.5 FL
MONOCYTES # BLD: 0.52 X10(3) UL (ref 0.1–1)
MONOCYTES NFR BLD: 4 %
MORPHOLOGY: NORMAL
NEUTROPHILS # BLD AUTO: 9.64 X10 (3) UL (ref 1.5–7.7)
NEUTROPHILS NFR BLD: 86 %
NEUTS HYPERSEG # BLD: 11.18 X10(3) UL (ref 1.5–7.7)
OSMOLALITY SERPL CALC.SUM OF ELEC: 282 MOSM/KG (ref 275–295)
PHOSPHATE SERPL-MCNC: 4 MG/DL (ref 2.5–4.9)
PLATELET # BLD AUTO: 647 10(3)UL (ref 150–450)
PLATELET MORPHOLOGY: NORMAL
POTASSIUM SERPL-SCNC: 4.7 MMOL/L (ref 3.5–5.1)
PROT SERPL-MCNC: 6.3 G/DL (ref 6.4–8.2)
RBC # BLD AUTO: 3.21 X10(6)UL
SODIUM SERPL-SCNC: 135 MMOL/L (ref 136–145)
TOTAL CELLS COUNTED BLD: 100
WBC # BLD AUTO: 13 X10(3) UL (ref 4–11)

## 2022-06-02 PROCEDURE — 99233 SBSQ HOSP IP/OBS HIGH 50: CPT | Performed by: INTERNAL MEDICINE

## 2022-06-02 PROCEDURE — 71045 X-RAY EXAM CHEST 1 VIEW: CPT | Performed by: INTERNAL MEDICINE

## 2022-06-02 PROCEDURE — 99223 1ST HOSP IP/OBS HIGH 75: CPT | Performed by: INTERNAL MEDICINE

## 2022-06-02 NOTE — DIETARY NOTE
Brief Nutrition Note:    Plan to place [place] today to assess if pt will tolerate feeds prior to gtube placement. Continue with PPN at this time per attending. PPN ordered as follows for tonight: 1800ml, 70 g protein, 1050 non protein calories (300 griselda from dextrose and 750 calories from fat)--providing total of 1330 total calories with 70 g protein to meet 83% min calorie and 93% min protein needs. Additives adjusted based on labs. Enteral Nutrition: Jevity 1.2 at 20 mL/hr continuous. Plan to assess tolerance and advance 6/3 as able to goal of 70 mL/hr continuous with discontinuation of PPN. Flushes will need to be increased once pt is off TPN. Goal rate will provide 1540 mL, 1848 kcal and 85 gm protein. NUTRITION PRESCRIPTION:   Estimated Nutrition needs: --dosing wt of 63 kg - wt taken on 5/20  Calories: 4569-2958 calories/day (25-30 calories per kg Current wt)  Protein: 75-94 g protein/day (1.2-1.5  g protein/kg Current wt)  Fluid Needs: 1 mL/kcal or per MD     Recent Labs     05/31/22  0639 06/01/22  0554 06/02/22  0528   * 125* 99   BUN 18 20* 19*   CREATSERUM 0.87 0.92 0.83   CA 9.0 8.8 8.6   MG 2.1 2.3 2.2    135* 135*   K 4.3 4.6 4.7    104 106   CO2 27.0 26.0 22.0   PHOS 3.7 3.9 4.0   OSMOCALC 287 284 282     Full Re-Assessment to be done 6/3.        Ashley Zamora, [address], [address]/ Alan Kumar [address]

## 2022-06-02 NOTE — SLP NOTE
Attempted to see pt for swallowing therapy. Chart reviewed and collaborated with RN. Pt is currently NPO for possible G-tube placement. Speech will reschedule therapy for when MD resumes po feedings. Kaykay SANABRIA [address] [place] [address] MS/CCC-SLP  Speech Language Pathologist  [facility]  EXT.  [phone]

## 2022-06-02 NOTE — PHYSICAL THERAPY NOTE
PT note: Pt was tearful confused and distractible in the am and requiring Ax2 for repositioning in bed in the am. Pt undergoing bed side diagnostic testing not available to participate in PM. PT will continue to follow and progress as medical progress and scheudle allows. Pt is being considered for a possible G tube placement to allow for optimal nutrition to allow for a  [facility] discharge as progress allows. PT is recommended [facility] with PT to regain maximal PLOF.

## 2022-06-03 ENCOUNTER — APPOINTMENT (OUTPATIENT)
Dept: PICC SERVICES | Facility: HOSPITAL | Age: 82
End: 2022-06-03
Attending: INTERNAL MEDICINE
Payer: MEDICARE

## 2022-06-03 LAB
ANION GAP SERPL CALC-SCNC: 5 MMOL/L (ref 0–18)
BASOPHILS # BLD AUTO: 0.07 X10(3) UL (ref 0–0.2)
BASOPHILS NFR BLD AUTO: 0.7 %
BUN BLD-MCNC: 17 MG/DL (ref 7–18)
BUN/CREAT SERPL: 19.3 (ref 10–20)
CALCIUM BLD-MCNC: 8.9 MG/DL (ref 8.5–10.1)
CHLORIDE SERPL-SCNC: 104 MMOL/L (ref 98–112)
CO2 SERPL-SCNC: 27 MMOL/L (ref 21–32)
CREAT BLD-MCNC: 0.88 MG/DL
DEPRECATED RDW RBC AUTO: 50.6 FL (ref 35.1–46.3)
EOSINOPHIL # BLD AUTO: 0.13 X10(3) UL (ref 0–0.7)
EOSINOPHIL NFR BLD AUTO: 1.3 %
ERYTHROCYTE [DISTWIDTH] IN BLOOD BY AUTOMATED COUNT: 14.3 % (ref 11–15)
GLUCOSE BLD-MCNC: 117 MG/DL (ref 70–99)
GLUCOSE BLDC GLUCOMTR-MCNC: 106 MG/DL (ref 70–99)
HCT VFR BLD AUTO: 31.8 %
HGB BLD-MCNC: 10.3 G/DL
IMM GRANULOCYTES # BLD AUTO: 0.33 X10(3) UL (ref 0–1)
IMM GRANULOCYTES NFR BLD: 3.2 %
LYMPHOCYTES # BLD AUTO: 1.44 X10(3) UL (ref 1–4)
LYMPHOCYTES NFR BLD AUTO: 13.9 %
MAGNESIUM SERPL-MCNC: 2.4 MG/DL (ref 1.6–2.6)
MCH RBC QN AUTO: 31.4 PG (ref 26–34)
MCHC RBC AUTO-ENTMCNC: 32.4 G/DL (ref 31–37)
MCV RBC AUTO: 97 FL
MONOCYTES # BLD AUTO: 0.7 X10(3) UL (ref 0.1–1)
MONOCYTES NFR BLD AUTO: 6.7 %
NEUTROPHILS # BLD AUTO: 7.72 X10 (3) UL (ref 1.5–7.7)
NEUTROPHILS # BLD AUTO: 7.72 X10(3) UL (ref 1.5–7.7)
NEUTROPHILS NFR BLD AUTO: 74.2 %
OSMOLALITY SERPL CALC.SUM OF ELEC: 285 MOSM/KG (ref 275–295)
PHOSPHATE SERPL-MCNC: 3.9 MG/DL (ref 2.5–4.9)
PLATELET # BLD AUTO: 709 10(3)UL (ref 150–450)
POTASSIUM SERPL-SCNC: 3.9 MMOL/L (ref 3.5–5.1)
RBC # BLD AUTO: 3.28 X10(6)UL
SODIUM SERPL-SCNC: 136 MMOL/L (ref 136–145)
WBC # BLD AUTO: 10.4 X10(3) UL (ref 4–11)

## 2022-06-03 PROCEDURE — 99233 SBSQ HOSP IP/OBS HIGH 50: CPT | Performed by: INTERNAL MEDICINE

## 2022-06-03 PROCEDURE — 99231 SBSQ HOSP IP/OBS SF/LOW 25: CPT | Performed by: REGISTERED NURSE

## 2022-06-03 NOTE — PROGRESS NOTES
06/03/22 1021   Wound 05/19/22 Incision Abdomen Medial;Upper   Date First Assessed/Time First Assessed: 05/19/22 1209   Primary Wound Type: Incision  Location: Abdomen  Wound Location Orientation: Medial;Upper   Wound Image    Site Assessment Fragile;Painful;Granulation tissue; Moist;Red   Drainage Amount Scant   Drainage Description Sanguineous   Treatments Cleansed;Site Care  (skin prep to the bridget wound)   Dressing Wound vac sponge   Dressing Changed Changed   Dressing Status Clean;Dry; Intact;Dressing Changed   Wound Length (cm) 14.5 cm   Wound Width (cm) 2 cm   Wound Surface Area (cm^2) 29 cm^2   Wound Depth (cm) 0.9 cm   Wound Volume (cm^3) 26.1 cm^3   Wound Healing % 61   Non-staged Wound Description Full thickness   Bridget-wound Assessment Dry; Intact;Fragile   Wound Granulation Tissue Red   Wound Bed Granulation (%) 100 %   Wound Odor None   Tunneling no   Undermining no   Wound Vac Brand KCI   State of Healing Early/partial granulation   Negative Pressure Wound Therapy Abdomen Mid   Placement Date/Time: 05/23/22 0945   Inserted by: Tessie Strauss RN  Wound Type: Surgical  Location: Abdomen  Wound Location Orientation: Mid   Wound photographed/measured Yes   Machine Status (On) Yes   Site Assessment Granulation tissue; Moist;Bleeding;Red   Bridget-wound Assessment Dry; Intact;Fragile   Unit Type KCI ULTA   Dressing Type Black foam   Number of Foam Pieces Used 2   Cycle Continuous; On   Target Pressure (mmHg) 125   Drainage Description Sanguineous   Dressing Status Clean;Dry; Intact;Dressing Changed   Canister Changed No   Wound Follow Up   Follow up needed Yes   Wound Care Services  Routine vac dressing change to the pt's abdominal wound. The pt's spouse and the nurse are at the bedside. The pt. was medicated for pain. The pt. may go for a G tube next week. The wound vac was removed, wound was cleansed, skin prep applied to the bridget wound.  Vac dressing changed, the vac is on with a seal at 125 mmHg cont suction. Next vac dressing change is Monday 6/6/22 He tolerated the dressing change well.

## 2022-06-04 LAB
ANION GAP SERPL CALC-SCNC: 6 MMOL/L (ref 0–18)
BASOPHILS # BLD AUTO: 0.07 X10(3) UL (ref 0–0.2)
BASOPHILS NFR BLD AUTO: 0.7 %
BUN BLD-MCNC: 16 MG/DL (ref 7–18)
BUN/CREAT SERPL: 19 (ref 10–20)
CALCIUM BLD-MCNC: 8.7 MG/DL (ref 8.5–10.1)
CHLORIDE SERPL-SCNC: 104 MMOL/L (ref 98–112)
CO2 SERPL-SCNC: 25 MMOL/L (ref 21–32)
CREAT BLD-MCNC: 0.84 MG/DL
DEPRECATED RDW RBC AUTO: 51.8 FL (ref 35.1–46.3)
EOSINOPHIL # BLD AUTO: 0.12 X10(3) UL (ref 0–0.7)
EOSINOPHIL NFR BLD AUTO: 1.2 %
ERYTHROCYTE [DISTWIDTH] IN BLOOD BY AUTOMATED COUNT: 14.4 % (ref 11–15)
GLUCOSE BLD-MCNC: 117 MG/DL (ref 70–99)
GLUCOSE BLDC GLUCOMTR-MCNC: 113 MG/DL (ref 70–99)
GLUCOSE BLDC GLUCOMTR-MCNC: 123 MG/DL (ref 70–99)
GLUCOSE BLDC GLUCOMTR-MCNC: 125 MG/DL (ref 70–99)
GLUCOSE BLDC GLUCOMTR-MCNC: 94 MG/DL (ref 70–99)
HCT VFR BLD AUTO: 33.7 %
HGB BLD-MCNC: 10.5 G/DL
IMM GRANULOCYTES # BLD AUTO: 0.24 X10(3) UL (ref 0–1)
IMM GRANULOCYTES NFR BLD: 2.4 %
LYMPHOCYTES # BLD AUTO: 1.83 X10(3) UL (ref 1–4)
LYMPHOCYTES NFR BLD AUTO: 18.3 %
MAGNESIUM SERPL-MCNC: 2.3 MG/DL (ref 1.6–2.6)
MCH RBC QN AUTO: 31 PG (ref 26–34)
MCHC RBC AUTO-ENTMCNC: 31.2 G/DL (ref 31–37)
MCV RBC AUTO: 99.4 FL
MONOCYTES # BLD AUTO: 0.7 X10(3) UL (ref 0.1–1)
MONOCYTES NFR BLD AUTO: 7 %
NEUTROPHILS # BLD AUTO: 7.03 X10 (3) UL (ref 1.5–7.7)
NEUTROPHILS # BLD AUTO: 7.03 X10(3) UL (ref 1.5–7.7)
NEUTROPHILS NFR BLD AUTO: 70.4 %
OSMOLALITY SERPL CALC.SUM OF ELEC: 282 MOSM/KG (ref 275–295)
PHOSPHATE SERPL-MCNC: 3.6 MG/DL (ref 2.5–4.9)
PLATELET # BLD AUTO: 693 10(3)UL (ref 150–450)
POTASSIUM SERPL-SCNC: 3.8 MMOL/L (ref 3.5–5.1)
RBC # BLD AUTO: 3.39 X10(6)UL
SODIUM SERPL-SCNC: 135 MMOL/L (ref 136–145)
WBC # BLD AUTO: 10 X10(3) UL (ref 4–11)

## 2022-06-04 PROCEDURE — 99233 SBSQ HOSP IP/OBS HIGH 50: CPT | Performed by: HOSPITALIST

## 2022-06-04 NOTE — PLAN OF CARE
Problem: Safety Risk - Non-Violent Restraints  Goal: Patient will remain free from self-harm  Description: INTERVENTIONS:  - Apply the least restrictive restraint to prevent harm  - Notify patient and family of reasons restraints applied  - Assess for any contributing factors to confusion (electrolyte disturbances, delirium, medications)  - Discontinue any unnecessary medical devices as soon as possible  - Assess the patient's physical comfort, circulation, skin condition, hydration, nutrition and elimination needs   - Reorient and redirection as needed  - Assess for the need to continue restraints  6/4/2022 1848 by Soren Lantigua RN  Outcome: Progressing  6/4/2022 1347 by Soren Lantigua, RN  Outcome: Progressing

## 2022-06-05 ENCOUNTER — APPOINTMENT (OUTPATIENT)
Dept: CT IMAGING | Facility: HOSPITAL | Age: 82
End: 2022-06-05
Attending: SURGERY
Payer: MEDICARE

## 2022-06-05 LAB
ALBUMIN SERPL-MCNC: 2.1 G/DL (ref 3.4–5)
ALP LIVER SERPL-CCNC: 161 U/L
ALT SERPL-CCNC: 58 U/L
ANION GAP SERPL CALC-SCNC: 5 MMOL/L (ref 0–18)
AST SERPL-CCNC: 35 U/L (ref 15–37)
BASOPHILS # BLD AUTO: 0.1 X10(3) UL (ref 0–0.2)
BASOPHILS NFR BLD AUTO: 0.9 %
BILIRUB DIRECT SERPL-MCNC: 0.3 MG/DL (ref 0–0.2)
BILIRUB SERPL-MCNC: 0.5 MG/DL (ref 0.1–2)
BUN BLD-MCNC: 15 MG/DL (ref 7–18)
BUN/CREAT SERPL: 17.6 (ref 10–20)
CALCIUM BLD-MCNC: 9 MG/DL (ref 8.5–10.1)
CHLORIDE SERPL-SCNC: 104 MMOL/L (ref 98–112)
CO2 SERPL-SCNC: 28 MMOL/L (ref 21–32)
CREAT BLD-MCNC: 0.85 MG/DL
DEPRECATED RDW RBC AUTO: 50.8 FL (ref 35.1–46.3)
EOSINOPHIL # BLD AUTO: 0.17 X10(3) UL (ref 0–0.7)
EOSINOPHIL NFR BLD AUTO: 1.5 %
ERYTHROCYTE [DISTWIDTH] IN BLOOD BY AUTOMATED COUNT: 14.2 % (ref 11–15)
GLUCOSE BLD-MCNC: 116 MG/DL (ref 70–99)
GLUCOSE BLDC GLUCOMTR-MCNC: 109 MG/DL (ref 70–99)
GLUCOSE BLDC GLUCOMTR-MCNC: 115 MG/DL (ref 70–99)
GLUCOSE BLDC GLUCOMTR-MCNC: 119 MG/DL (ref 70–99)
HCT VFR BLD AUTO: 33 %
HGB BLD-MCNC: 10.4 G/DL
IMM GRANULOCYTES # BLD AUTO: 0.31 X10(3) UL (ref 0–1)
IMM GRANULOCYTES NFR BLD: 2.8 %
LYMPHOCYTES # BLD AUTO: 2.61 X10(3) UL (ref 1–4)
LYMPHOCYTES NFR BLD AUTO: 23.8 %
MAGNESIUM SERPL-MCNC: 2.3 MG/DL (ref 1.6–2.6)
MCH RBC QN AUTO: 31.1 PG (ref 26–34)
MCHC RBC AUTO-ENTMCNC: 31.5 G/DL (ref 31–37)
MCV RBC AUTO: 98.8 FL
MONOCYTES # BLD AUTO: 0.95 X10(3) UL (ref 0.1–1)
MONOCYTES NFR BLD AUTO: 8.7 %
NEUTROPHILS # BLD AUTO: 6.83 X10 (3) UL (ref 1.5–7.7)
NEUTROPHILS # BLD AUTO: 6.83 X10(3) UL (ref 1.5–7.7)
NEUTROPHILS NFR BLD AUTO: 62.3 %
OSMOLALITY SERPL CALC.SUM OF ELEC: 286 MOSM/KG (ref 275–295)
PHOSPHATE SERPL-MCNC: 3.2 MG/DL (ref 2.5–4.9)
PLATELET # BLD AUTO: 715 10(3)UL (ref 150–450)
POTASSIUM SERPL-SCNC: 4 MMOL/L (ref 3.5–5.1)
PROT SERPL-MCNC: 6.6 G/DL (ref 6.4–8.2)
RBC # BLD AUTO: 3.34 X10(6)UL
SARS-COV-2 RNA RESP QL NAA+PROBE: NOT DETECTED
SODIUM SERPL-SCNC: 137 MMOL/L (ref 136–145)
WBC # BLD AUTO: 11 X10(3) UL (ref 4–11)

## 2022-06-05 PROCEDURE — 74177 CT ABD & PELVIS W/CONTRAST: CPT | Performed by: SURGERY

## 2022-06-05 PROCEDURE — 99233 SBSQ HOSP IP/OBS HIGH 50: CPT | Performed by: HOSPITALIST

## 2022-06-06 ENCOUNTER — APPOINTMENT (OUTPATIENT)
Dept: INTERVENTIONAL RADIOLOGY/VASCULAR | Facility: HOSPITAL | Age: 82
End: 2022-06-06
Attending: SURGERY
Payer: MEDICARE

## 2022-06-06 LAB
ALBUMIN SERPL-MCNC: 2.1 G/DL (ref 3.4–5)
ALP LIVER SERPL-CCNC: 231 U/L
ALT SERPL-CCNC: 87 U/L
ANION GAP SERPL CALC-SCNC: 7 MMOL/L (ref 0–18)
AST SERPL-CCNC: 63 U/L (ref 15–37)
BASOPHILS # BLD AUTO: 0.05 X10(3) UL (ref 0–0.2)
BASOPHILS NFR BLD AUTO: 0.6 %
BILIRUB DIRECT SERPL-MCNC: 0.3 MG/DL (ref 0–0.2)
BILIRUB SERPL-MCNC: 0.6 MG/DL (ref 0.1–2)
BUN BLD-MCNC: 14 MG/DL (ref 7–18)
BUN/CREAT SERPL: 16.9 (ref 10–20)
CALCIUM BLD-MCNC: 8.8 MG/DL (ref 8.5–10.1)
CHLORIDE SERPL-SCNC: 104 MMOL/L (ref 98–112)
CO2 SERPL-SCNC: 26 MMOL/L (ref 21–32)
CREAT BLD-MCNC: 0.83 MG/DL
DEPRECATED RDW RBC AUTO: 50.4 FL (ref 35.1–46.3)
EOSINOPHIL # BLD AUTO: 0.15 X10(3) UL (ref 0–0.7)
EOSINOPHIL NFR BLD AUTO: 1.9 %
ERYTHROCYTE [DISTWIDTH] IN BLOOD BY AUTOMATED COUNT: 14.3 % (ref 11–15)
GLUCOSE BLD-MCNC: 106 MG/DL (ref 70–99)
GLUCOSE BLDC GLUCOMTR-MCNC: 112 MG/DL (ref 70–99)
GLUCOSE BLDC GLUCOMTR-MCNC: 143 MG/DL (ref 70–99)
GLUCOSE BLDC GLUCOMTR-MCNC: 80 MG/DL (ref 70–99)
GLUCOSE BLDC GLUCOMTR-MCNC: 97 MG/DL (ref 70–99)
HCT VFR BLD AUTO: 29.9 %
HGB BLD-MCNC: 9.7 G/DL
IMM GRANULOCYTES # BLD AUTO: 0.23 X10(3) UL (ref 0–1)
IMM GRANULOCYTES NFR BLD: 2.9 %
LYMPHOCYTES # BLD AUTO: 1.58 X10(3) UL (ref 1–4)
LYMPHOCYTES NFR BLD AUTO: 19.7 %
MAGNESIUM SERPL-MCNC: 2.1 MG/DL (ref 1.6–2.6)
MCH RBC QN AUTO: 31.6 PG (ref 26–34)
MCHC RBC AUTO-ENTMCNC: 32.4 G/DL (ref 31–37)
MCV RBC AUTO: 97.4 FL
MONOCYTES # BLD AUTO: 0.6 X10(3) UL (ref 0.1–1)
MONOCYTES NFR BLD AUTO: 7.5 %
NEUTROPHILS # BLD AUTO: 5.4 X10 (3) UL (ref 1.5–7.7)
NEUTROPHILS # BLD AUTO: 5.4 X10(3) UL (ref 1.5–7.7)
NEUTROPHILS NFR BLD AUTO: 67.4 %
OSMOLALITY SERPL CALC.SUM OF ELEC: 285 MOSM/KG (ref 275–295)
PHOSPHATE SERPL-MCNC: 2.8 MG/DL (ref 2.5–4.9)
PLATELET # BLD AUTO: 719 10(3)UL (ref 150–450)
POTASSIUM SERPL-SCNC: 3.6 MMOL/L (ref 3.5–5.1)
PROT SERPL-MCNC: 6.4 G/DL (ref 6.4–8.2)
RBC # BLD AUTO: 3.07 X10(6)UL
SODIUM SERPL-SCNC: 137 MMOL/L (ref 136–145)
TRIGL SERPL-MCNC: 54 MG/DL (ref 30–149)
WBC # BLD AUTO: 8 X10(3) UL (ref 4–11)

## 2022-06-06 PROCEDURE — 99233 SBSQ HOSP IP/OBS HIGH 50: CPT | Performed by: INTERNAL MEDICINE

## 2022-06-06 PROCEDURE — 0W9G3ZZ DRAINAGE OF PERITONEAL CAVITY, PERCUTANEOUS APPROACH: ICD-10-PCS | Performed by: RADIOLOGY

## 2022-06-06 PROCEDURE — 99233 SBSQ HOSP IP/OBS HIGH 50: CPT | Performed by: HOSPITALIST

## 2022-06-06 RX ORDER — LIDOCAINE HYDROCHLORIDE 20 MG/ML
INJECTION, SOLUTION EPIDURAL; INFILTRATION; INTRACAUDAL; PERINEURAL
Status: COMPLETED
Start: 2022-06-06 | End: 2022-06-06

## 2022-06-06 RX ORDER — DEXTROSE MONOHYDRATE 50 MG/ML
INJECTION, SOLUTION INTRAVENOUS CONTINUOUS
Status: DISCONTINUED | OUTPATIENT
Start: 2022-06-06 | End: 2022-06-10

## 2022-06-06 NOTE — PROGRESS NOTES
06/06/22 1420   Wound 05/19/22 Incision Abdomen Medial;Upper   Date First Assessed/Time First Assessed: 05/19/22 1209   Primary Wound Type: Incision  Location: Abdomen  Wound Location Orientation: Medial;Upper   Wound Image    Site Assessment Clean;Fragile;Granulation tissue; Moist;Painful;Red   Closure Approximated   Drainage Amount Scant   Drainage Description Sanguineous   Treatments Cleansed; Saline/Wound ; Wound Vac - Neg Pressure   Dressing Wound vac sponge   Dressing Changed Changed   Dressing Status Dressing Changed; Old drainage;Removed   Non-staged Wound Description Full thickness   Celia-wound Assessment Clean;Dry; Intact   Wound Granulation Tissue Red   Wound Odor None   Wound Vac Brand KCI   State of Healing Early/partial granulation   Wound Follow Up   Follow up needed Yes   pt seen for abdominal wound vac dressing change. Wound is clean, see above. One pc vac sponge applied to wound bed, patent seal achieved at 125 mmHg. Next wound vac dressing change is due on Wednesday.

## 2022-06-06 NOTE — CM/SW NOTE
Pt discussed during nursing rounds. Tessa Maegan accepted patient for [facility], ins auth  on . Possible GT placement per RN. Pt currently requiring restraints for safety. LTAC referrals sent due to medical complexity and need fro restraints. Pt has 3 day stay in ICU.     1515: Pt's spouse agreeable to LTAC at [place]. [facility] has accepted and is awaiting ok to speak with pt's family. SW/CM waiting on decision from [facility] so additional facility can offered for choice. Plan: LTAC pending facility choice medical clearance. / to remain available for support and/or discharge planning.      Vickey Her, BSN    [phone]

## 2022-06-06 NOTE — PHYSICAL THERAPY NOTE
Chart reviewed for PT treatment. New imaging shows a collection of fluid by the liver, going for IR procedure this morning, will check back.

## 2022-06-07 LAB
ANION GAP SERPL CALC-SCNC: 7 MMOL/L (ref 0–18)
BASOPHILS # BLD AUTO: 0.04 X10(3) UL (ref 0–0.2)
BASOPHILS NFR BLD AUTO: 0.5 %
BUN BLD-MCNC: 13 MG/DL (ref 7–18)
BUN/CREAT SERPL: 14.6 (ref 10–20)
CALCIUM BLD-MCNC: 9 MG/DL (ref 8.5–10.1)
CHLORIDE SERPL-SCNC: 106 MMOL/L (ref 98–112)
CO2 SERPL-SCNC: 27 MMOL/L (ref 21–32)
CREAT BLD-MCNC: 0.89 MG/DL
DEPRECATED RDW RBC AUTO: 50 FL (ref 35.1–46.3)
EOSINOPHIL # BLD AUTO: 0.15 X10(3) UL (ref 0–0.7)
EOSINOPHIL NFR BLD AUTO: 1.9 %
ERYTHROCYTE [DISTWIDTH] IN BLOOD BY AUTOMATED COUNT: 14.1 % (ref 11–15)
GLUCOSE BLD-MCNC: 93 MG/DL (ref 70–99)
GLUCOSE BLDC GLUCOMTR-MCNC: 107 MG/DL (ref 70–99)
GLUCOSE BLDC GLUCOMTR-MCNC: 115 MG/DL (ref 70–99)
GLUCOSE BLDC GLUCOMTR-MCNC: 75 MG/DL (ref 70–99)
GLUCOSE BLDC GLUCOMTR-MCNC: 76 MG/DL (ref 70–99)
GLUCOSE BLDC GLUCOMTR-MCNC: 93 MG/DL (ref 70–99)
HCT VFR BLD AUTO: 32.8 %
HGB BLD-MCNC: 10.5 G/DL
IMM GRANULOCYTES # BLD AUTO: 0.19 X10(3) UL (ref 0–1)
IMM GRANULOCYTES NFR BLD: 2.3 %
LYMPHOCYTES # BLD AUTO: 2.27 X10(3) UL (ref 1–4)
LYMPHOCYTES NFR BLD AUTO: 28.1 %
MAGNESIUM SERPL-MCNC: 2 MG/DL (ref 1.6–2.6)
MCH RBC QN AUTO: 31.2 PG (ref 26–34)
MCHC RBC AUTO-ENTMCNC: 32 G/DL (ref 31–37)
MCV RBC AUTO: 97.3 FL
MONOCYTES # BLD AUTO: 0.71 X10(3) UL (ref 0.1–1)
MONOCYTES NFR BLD AUTO: 8.8 %
NEUTROPHILS # BLD AUTO: 4.73 X10 (3) UL (ref 1.5–7.7)
NEUTROPHILS # BLD AUTO: 4.73 X10(3) UL (ref 1.5–7.7)
NEUTROPHILS NFR BLD AUTO: 58.4 %
OSMOLALITY SERPL CALC.SUM OF ELEC: 290 MOSM/KG (ref 275–295)
PHOSPHATE SERPL-MCNC: 2.4 MG/DL (ref 2.5–4.9)
PLATELET # BLD AUTO: 716 10(3)UL (ref 150–450)
POTASSIUM SERPL-SCNC: 3.8 MMOL/L (ref 3.5–5.1)
RBC # BLD AUTO: 3.37 X10(6)UL
SODIUM SERPL-SCNC: 140 MMOL/L (ref 136–145)
WBC # BLD AUTO: 8.1 X10(3) UL (ref 4–11)

## 2022-06-07 PROCEDURE — 99233 SBSQ HOSP IP/OBS HIGH 50: CPT | Performed by: HOSPITALIST

## 2022-06-07 PROCEDURE — 99233 SBSQ HOSP IP/OBS HIGH 50: CPT | Performed by: INTERNAL MEDICINE

## 2022-06-07 RX ORDER — CEFAZOLIN SODIUM/WATER 2 G/20 ML
2 SYRINGE (ML) INTRAVENOUS ONCE
Status: COMPLETED | OUTPATIENT
Start: 2022-06-07 | End: 2022-06-08

## 2022-06-07 RX ORDER — QUETIAPINE FUMARATE 25 MG/1
25 TABLET, FILM COATED ORAL NIGHTLY PRN
Status: DISCONTINUED | OUTPATIENT
Start: 2022-06-07 | End: 2022-06-09

## 2022-06-07 NOTE — PROGRESS NOTES
06/07/22 0810   Negative Pressure Wound Therapy Abdomen Mid   Placement Date/Time: 05/23/22 0945   Inserted by: Duarte Golden RN  Wound Type: Surgical  Location: Abdomen  Wound Location Orientation: Mid   Wound photographed/measured No   Machine Status (On) Yes   Site Assessment ATA   Celia-wound Assessment Dry; Intact;Fragile   Unit Type KCI ULTA   Dressing Type Black foam   Number of Foam Pieces Used 1   Cycle Continuous; On   Target Pressure (mmHg) 125   Drainage Description Sanguineous   Dressing Status Intact   Canister Changed No   Wound Follow Up   Follow up needed Yes   pt seen for wound vac check. Plan for pt to discharge to SNF. Next wound vac dressing change due tomorrow.

## 2022-06-07 NOTE — PLAN OF CARE
Tiffany Tavarez continues to be confused. Restless and constantly calling out for his wife. Patient went to IR for drainage of abdominal fluid this am. Tube feedings restarted per surgery this afternoon. Tolerating goal rate of 70 ml/hr. Patient removed flexiseal this morning. Flexiseal reinserted due to continuous diarrhea. Remains in place for rest of the shift. Isolation maintained. Soft wrist restraints continued. Frequent rounding completed. Discharge planning pending. Safety measures in place and call light within reach.

## 2022-06-07 NOTE — CM/SW NOTE
Met with wife, Sarah Beth Iraheta at bedside. Sarah Beth Iraheta confirmed that she would like patient to go to [facility]. SW discussed process, notified Sarah Beth Iraheta that [facility] will reach out to her to ask questions and will initiate insurance authorization. PLAN: [facility] pending auth.        SARMAD Stafford, [facility]    [id number]

## 2022-06-08 ENCOUNTER — ANESTHESIA (OUTPATIENT)
Dept: ENDOSCOPY | Facility: HOSPITAL | Age: 82
End: 2022-06-08
Payer: MEDICARE

## 2022-06-08 ENCOUNTER — ANESTHESIA EVENT (OUTPATIENT)
Dept: ENDOSCOPY | Facility: HOSPITAL | Age: 82
End: 2022-06-08
Payer: MEDICARE

## 2022-06-08 LAB
ANION GAP SERPL CALC-SCNC: 7 MMOL/L (ref 0–18)
BASOPHILS # BLD AUTO: 0.05 X10(3) UL (ref 0–0.2)
BASOPHILS NFR BLD AUTO: 0.6 %
BUN BLD-MCNC: 11 MG/DL (ref 7–18)
BUN/CREAT SERPL: 12 (ref 10–20)
CALCIUM BLD-MCNC: 9.3 MG/DL (ref 8.5–10.1)
CHLORIDE SERPL-SCNC: 107 MMOL/L (ref 98–112)
CO2 SERPL-SCNC: 26 MMOL/L (ref 21–32)
CREAT BLD-MCNC: 0.92 MG/DL
DEPRECATED RDW RBC AUTO: 50.1 FL (ref 35.1–46.3)
EOSINOPHIL # BLD AUTO: 0.23 X10(3) UL (ref 0–0.7)
EOSINOPHIL NFR BLD AUTO: 3 %
ERYTHROCYTE [DISTWIDTH] IN BLOOD BY AUTOMATED COUNT: 14 % (ref 11–15)
GLUCOSE BLD-MCNC: 83 MG/DL (ref 70–99)
GLUCOSE BLDC GLUCOMTR-MCNC: 75 MG/DL (ref 70–99)
GLUCOSE BLDC GLUCOMTR-MCNC: 84 MG/DL (ref 70–99)
GLUCOSE BLDC GLUCOMTR-MCNC: 84 MG/DL (ref 70–99)
GLUCOSE BLDC GLUCOMTR-MCNC: 87 MG/DL (ref 70–99)
HCT VFR BLD AUTO: 32.8 %
HGB BLD-MCNC: 10.5 G/DL
IMM GRANULOCYTES # BLD AUTO: 0.16 X10(3) UL (ref 0–1)
IMM GRANULOCYTES NFR BLD: 2.1 %
LYMPHOCYTES # BLD AUTO: 2.26 X10(3) UL (ref 1–4)
LYMPHOCYTES NFR BLD AUTO: 29.2 %
MAGNESIUM SERPL-MCNC: 2 MG/DL (ref 1.6–2.6)
MCH RBC QN AUTO: 31.2 PG (ref 26–34)
MCHC RBC AUTO-ENTMCNC: 32 G/DL (ref 31–37)
MCV RBC AUTO: 97.3 FL
MONOCYTES # BLD AUTO: 0.69 X10(3) UL (ref 0.1–1)
MONOCYTES NFR BLD AUTO: 8.9 %
NEUTROPHILS # BLD AUTO: 4.36 X10 (3) UL (ref 1.5–7.7)
NEUTROPHILS # BLD AUTO: 4.36 X10(3) UL (ref 1.5–7.7)
NEUTROPHILS NFR BLD AUTO: 56.2 %
OSMOLALITY SERPL CALC.SUM OF ELEC: 289 MOSM/KG (ref 275–295)
PHOSPHATE SERPL-MCNC: 2.9 MG/DL (ref 2.5–4.9)
PLATELET # BLD AUTO: 629 10(3)UL (ref 150–450)
POTASSIUM SERPL-SCNC: 4 MMOL/L (ref 3.5–5.1)
RBC # BLD AUTO: 3.37 X10(6)UL
SODIUM SERPL-SCNC: 140 MMOL/L (ref 136–145)
WBC # BLD AUTO: 7.8 X10(3) UL (ref 4–11)

## 2022-06-08 PROCEDURE — 43246 EGD PLACE GASTROSTOMY TUBE: CPT | Performed by: INTERNAL MEDICINE

## 2022-06-08 PROCEDURE — 0DH63UZ INSERTION OF FEEDING DEVICE INTO STOMACH, PERCUTANEOUS APPROACH: ICD-10-PCS | Performed by: INTERNAL MEDICINE

## 2022-06-08 PROCEDURE — 99233 SBSQ HOSP IP/OBS HIGH 50: CPT | Performed by: HOSPITALIST

## 2022-06-08 RX ORDER — SODIUM CHLORIDE, SODIUM LACTATE, POTASSIUM CHLORIDE, CALCIUM CHLORIDE 600; 310; 30; 20 MG/100ML; MG/100ML; MG/100ML; MG/100ML
INJECTION, SOLUTION INTRAVENOUS CONTINUOUS PRN
Status: DISCONTINUED | OUTPATIENT
Start: 2022-06-08 | End: 2022-06-08 | Stop reason: SURG

## 2022-06-08 RX ORDER — CEFAZOLIN SODIUM/WATER 2 G/20 ML
SYRINGE (ML) INTRAVENOUS AS NEEDED
Status: DISCONTINUED | OUTPATIENT
Start: 2022-06-08 | End: 2022-06-08 | Stop reason: SURG

## 2022-06-08 RX ORDER — LIDOCAINE HYDROCHLORIDE 10 MG/ML
INJECTION, SOLUTION EPIDURAL; INFILTRATION; INTRACAUDAL; PERINEURAL AS NEEDED
Status: DISCONTINUED | OUTPATIENT
Start: 2022-06-08 | End: 2022-06-08 | Stop reason: SURG

## 2022-06-08 RX ADMIN — CEFAZOLIN SODIUM/WATER 2 G: 2 G/20 ML SYRINGE (ML) INTRAVENOUS at 17:23:00

## 2022-06-08 RX ADMIN — SODIUM CHLORIDE, SODIUM LACTATE, POTASSIUM CHLORIDE, CALCIUM CHLORIDE: 600; 310; 30; 20 INJECTION, SOLUTION INTRAVENOUS at 17:17:00

## 2022-06-08 RX ADMIN — LIDOCAINE HYDROCHLORIDE 50 MG: 10 INJECTION, SOLUTION EPIDURAL; INFILTRATION; INTRACAUDAL; PERINEURAL at 17:23:00

## 2022-06-08 NOTE — CM/SW NOTE
MARICARMEN requested status of insurance authorization for [facility]. Awaiting review and response. Plan: [facility] pending insurance authorization    SW/CM to remain available for support and/or discharge planning.      SARMAD Kwon, [facility]   [id number]

## 2022-06-08 NOTE — PLAN OF CARE
Orval Amberly confused/ forgetful/ agitated. Diet resumed- patient has a very low appetite. Agreeable to eat small bites of pudding with his wife/ nurse assistance. Plan for G-tube placement tomorrow. Soft wrist restraints continued. Isolation maintained. Frequent rounding completed. Discharge planning pending. Safety measures in place and call light within reach.

## 2022-06-08 NOTE — INTERVAL H&P NOTE
Pre-op Diagnosis: dysphagia    The above referenced H&P was reviewed by Cindi Payne MD on 6/8/2022, the patient was examined and no significant changes have occurred in the patient's condition since the H&P was performed. I discussed with the patient and/or legal representative the potential benefits, risks and side effects of this procedure; the likelihood of the patient achieving goals; and potential problems that might occur during recuperation. I discussed reasonable alternatives to the procedure, including risks, benefits and side effects related to the alternatives and risks related to not receiving this procedure. We will proceed with procedure as planned.

## 2022-06-08 NOTE — OPERATIVE REPORT
ESOPHAGOGASTRODUODENOSCOPY (EGD) WITH PERCUTANEOUS ENDOSCOPIC GASTROSTOMY PLACEMENT (PEG)    Gurjit Holt     1940 Age [de-identified]year old   PCP Priscilla Fernandez MD Endoscopist Jessica Rg MD     Date of procedure: 22    Procedure: EGD w/PEG     Pre-operative diagnosis: dysphagia    Post-operative diagnosis: see impression    Medications:  MAC     Pre-procedural antibiotics: ancef 2g    Complications: none    Procedure:  Informed consent was obtained from the patient after the risks of the procedure were discussed, including but not limited to bleeding, perforation, aspiration, infection, possibility of a missed lesion or death. Additionally the risk of inadvertent self dislodgement and risk of pneumoperitoneum, sepsis, death discussed with patient's wife ROBERT Ferguson. After discussions of the risks/benefits and alternatives to this procedure, as well as the planned sedation, the patient was placed in the left lateral decubitus position and begun on continuous blood pressure pulse oximetry and EKG monitoring and this was maintained throughout the procedure. Once an adequate level of sedation was obtained a bite block was placed. Then the lubricated tip of the [id number] diagnostic video upper endoscope was inserted and advanced using direct visualization into the posterior pharynx and ultimately into the esophagus. Percutaneous endoscopic gastrostomy: In a darkened room, the abdominal wall was transilluminated and a site was selected. Indentation of the gastric wall by external finger pressure was demonstrated in a circular fashion. The skin was cleansed with chlorhexadine and betadine and anesthetized with xylocaine with a finder needle. The finder needle was visualized endoscopically and simultaneously bubbles were noted in the aspirating syringe. A small 10mm incision was made in the abdominal wall using a surgical blade.  A 25-gauge needle with cannula was inserted through the abdominal wall and into the gastric lumen. A guidewire was passed through the cannula, was caught by the snare passed through the endoscope and brought out through the mouth. A PEG tube was inserted over the guidewire and advanced through the abdominal wall. The PEG tube was noted at 2.5 cm at the skin with the tube pulled taught. A satisfactory final position was confirmed endoscopically. The gastrostomy tube was secured with the outer flange positioned which was positioned at 3 cm. Complications: None    Findings:    1. Esophagus: Normal esophagus    2. Stomach: The stomach distended normally. Normal rugal folds were seen. The pylorus was patent. The gastric mucosa appeared normal. Retroflexion revealed a normal fundus and a non-patulous cardia. 3. Duodenum: The duodenal mucosa appeared normal in the 1st and 2nd portion of the duodenum. Impression:  -Successful placement of a 20F Gastrostomy tube. Recommend:  -Diet: NPO for 24 hours, will adjust external bumper tomorrow and reassess to start tube-feeds.   -Okay to use G-tube immediately for medications/flushes. To ensure patency, flush the tube with 100ml of water q 8 hours. -Monitor for signs of GI bleeding.     Specimens:  none

## 2022-06-08 NOTE — ANESTHESIA PROCEDURE NOTES
Peripheral IV  Date/Time: 6/8/2022 5:33 PM  Inserted by: Bridger Maciel CRNA    Placement  Needle size: 25 G  Laterality: right  Location: hand  Local anesthetic: none  Site prep: alcohol  Technique: anatomical landmarks  Attempts: 2

## 2022-06-09 PROBLEM — F05 DELIRIUM SUPERIMPOSED ON DEMENTIA: Status: ACTIVE | Noted: 2022-06-09

## 2022-06-09 PROBLEM — F01.51 VASCULAR DEMENTIA WITH BEHAVIORAL DISTURBANCE (HCC): Status: ACTIVE | Noted: 2022-06-09

## 2022-06-09 PROBLEM — F39 EPISODIC MOOD DISORDER (HCC): Status: ACTIVE | Noted: 2022-06-09

## 2022-06-09 LAB
ANION GAP SERPL CALC-SCNC: 7 MMOL/L (ref 0–18)
BASOPHILS # BLD AUTO: 0.04 X10(3) UL (ref 0–0.2)
BASOPHILS NFR BLD AUTO: 0.4 %
BUN BLD-MCNC: 9 MG/DL (ref 7–18)
BUN/CREAT SERPL: 9.7 (ref 10–20)
CALCIUM BLD-MCNC: 9.1 MG/DL (ref 8.5–10.1)
CHLORIDE SERPL-SCNC: 106 MMOL/L (ref 98–112)
CO2 SERPL-SCNC: 25 MMOL/L (ref 21–32)
CREAT BLD-MCNC: 0.93 MG/DL
DEPRECATED RDW RBC AUTO: 50.7 FL (ref 35.1–46.3)
EOSINOPHIL # BLD AUTO: 0.17 X10(3) UL (ref 0–0.7)
EOSINOPHIL NFR BLD AUTO: 1.8 %
ERYTHROCYTE [DISTWIDTH] IN BLOOD BY AUTOMATED COUNT: 14.1 % (ref 11–15)
GLUCOSE BLD-MCNC: 85 MG/DL (ref 70–99)
GLUCOSE BLDC GLUCOMTR-MCNC: 109 MG/DL (ref 70–99)
GLUCOSE BLDC GLUCOMTR-MCNC: 69 MG/DL (ref 70–99)
GLUCOSE BLDC GLUCOMTR-MCNC: 98 MG/DL (ref 70–99)
HCT VFR BLD AUTO: 31.8 %
HGB BLD-MCNC: 10.2 G/DL
IMM GRANULOCYTES # BLD AUTO: 0.08 X10(3) UL (ref 0–1)
IMM GRANULOCYTES NFR BLD: 0.8 %
LYMPHOCYTES # BLD AUTO: 1.94 X10(3) UL (ref 1–4)
LYMPHOCYTES NFR BLD AUTO: 20.1 %
MAGNESIUM SERPL-MCNC: 2 MG/DL (ref 1.6–2.6)
MCH RBC QN AUTO: 31.4 PG (ref 26–34)
MCHC RBC AUTO-ENTMCNC: 32.1 G/DL (ref 31–37)
MCV RBC AUTO: 97.8 FL
MONOCYTES # BLD AUTO: 0.73 X10(3) UL (ref 0.1–1)
MONOCYTES NFR BLD AUTO: 7.6 %
NEUTROPHILS # BLD AUTO: 6.7 X10 (3) UL (ref 1.5–7.7)
NEUTROPHILS # BLD AUTO: 6.7 X10(3) UL (ref 1.5–7.7)
NEUTROPHILS NFR BLD AUTO: 69.3 %
OSMOLALITY SERPL CALC.SUM OF ELEC: 284 MOSM/KG (ref 275–295)
PLATELET # BLD AUTO: 565 10(3)UL (ref 150–450)
POTASSIUM SERPL-SCNC: 3.8 MMOL/L (ref 3.5–5.1)
RBC # BLD AUTO: 3.25 X10(6)UL
SODIUM SERPL-SCNC: 138 MMOL/L (ref 136–145)
WBC # BLD AUTO: 9.7 X10(3) UL (ref 4–11)

## 2022-06-09 PROCEDURE — 99233 SBSQ HOSP IP/OBS HIGH 50: CPT | Performed by: INTERNAL MEDICINE

## 2022-06-09 PROCEDURE — 90792 PSYCH DIAG EVAL W/MED SRVCS: CPT | Performed by: OTHER

## 2022-06-09 PROCEDURE — 99233 SBSQ HOSP IP/OBS HIGH 50: CPT | Performed by: HOSPITALIST

## 2022-06-09 RX ORDER — QUETIAPINE FUMARATE 25 MG/1
25 TABLET, FILM COATED ORAL NIGHTLY
Status: DISCONTINUED | OUTPATIENT
Start: 2022-06-09 | End: 2022-06-14

## 2022-06-09 RX ORDER — SODIUM BICARBONATE 650 MG/1
325 TABLET ORAL AS NEEDED
Status: DISCONTINUED | OUTPATIENT
Start: 2022-06-09 | End: 2022-06-14

## 2022-06-09 RX ORDER — DIVALPROEX SODIUM 125 MG/1
125 CAPSULE, COATED PELLETS ORAL 2 TIMES DAILY
Status: DISCONTINUED | OUTPATIENT
Start: 2022-06-09 | End: 2022-06-14

## 2022-06-09 NOTE — CM/SW NOTE
Beryl Mahan with [employer] assist with discharge planning/barriers [phone]  SW left a message with the DC plan for [facility]ano hopefully tomorrow and insurance Medina Benson is pending.      Avni Brown, [facility] ext [phone]

## 2022-06-09 NOTE — CM/SW NOTE
Updates sent to [facility], still awaiting insurance auth. SW to provide updates to medical team once notified by [facility] liaison.        SARMAD Hall, [facility]    [id number]

## 2022-06-09 NOTE — PROGRESS NOTES
06/09/22 1010   Negative Pressure Wound Therapy Abdomen Mid   Placement Date/Time: 05/23/22 0945   Inserted by: Shaye Zafar RN  Wound Type: Surgical  Location: Abdomen  Wound Location Orientation: Mid   Wound photographed/measured No   Machine Status (On) Yes   Site Assessment ATA   Unit Type KCI ULTA   Cycle Continuous; On   Target Pressure (mmHg) 125   Drainage Description Sanguineous   Dressing Status Intact   Canister Changed No   Wound Follow Up   Follow up needed Yes

## 2022-06-09 NOTE — BH PROGRESS NOTE
Behavioral Health Note:  REASON FOR ADMISSION:   Fatigue, generalized weakness    REASON FOR CONSULT:  Psychiatry consultation requested for evaluation and advice d/t sustained delirium superimposed on dementia    OBJECTIVE:  Tiffany Krause is a  [de-identified]year old male who presents d/t fatigue and generalized weakness, he was said to have fallen 2 days PTA and has struggled with doing things independently since that time. Tiffany Krause was found to have renal insufficiency, leukocytosis, small bowel obstruction and small bowel ischemia upon admission. He has PMH significant for dementia, HTN, enlarged prostate, HL. Psych consult ordered for Dr. Nellie Sosa and [facility] met with Mingo's wife, Ellen Roth, at bedside for initial consultation as Tiffany Krause was unable to engage in consultation d/t fatigue and disorientation. CHIEF COMPLAINT:   Ellen Ohrima today reports that Tiffany Krause is a/o x2-2.5 at his baseline and that this is his first onset of delirium, stating \"he's never been this sick before\" she reports that he was dx with dementia ~5 years ago, Dimastara Gonzalez reports that Tiffany Krause needs reminders on things regarding where he's at but is easily redirectable and can remember the physical things about a place he is at or where they went but needs reminders on the name and type of place they're at. She reports that Tiffany Krause has hx of AVH and mumbles a lot of talks about nothing often when they're at home and will often ask if she's talking to him or if the TV is talking to him. Ravisilvano Gonzalez reports that Tiffany Krause was independent with ADLS PTA but she manages his medications however he was refusing his meds for the first time PTA, he had also refused to eat for 4 days PTA and would only drink water during this time as well, she reports that Tiffany Krause otherwise has no issues with his appetite. Ellen Gonzalez reports Tiffany Krause has hx of bereavement depression after the passing of their two sons, with the last passing in 2012.  She reports that his brother passed away prior to his admission but doesn't report any observed depressive sx, stating he usually keeps it all in so it's hard to tell at times. She denies current or hx of anxiety/panic attacks for Mingo. Curt Walter reports that Camryn Osei was sleeping more during the day PTA but sleeps well at night and will sometimes get up to use the bathroom but is able to sleep easily after he gets back into bed. Curt Walter denies hx of bipolar disorder and denies hx SI/HI/SIB for Mingo. PAST PSYCHIATRIC HISTORY:  Past diagnosis: dementia  Past inpatient: none  Past outpatient: neurology for dementia medication management  Medication: namenda 5mg PTA (~5 years - present) dosage changed to 5mg BID during this admission as of 5/27/2022    SOCIAL HISTORY:  Camryn Osei is a  [de-identified]year old male who lives in [place] with his wife, Curt Walter and their grandchildren. They have two living children who live outside of the home and two sons who have passed away. 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 He has no hx of alcohol (hx occasional social use), tobacco, cannabis or illicit substance abuse. MENTAL STATUS EXAM:  ATA, Chastitythony 48 completed initial consultation with Mingo's wife, Curt Walter at bedside as Camryn Osei was unable to engage in consultation d/t fatigue and disorientation. SUICIDAL RISK ASSESSMENT  Per Riya Chamberlain has no hx SI/HI/SIB, CSSR=0, INDICATING LOW RISK    ASSESSMENT  Camryn Osei has a dx r/o unspecified delirium d/t other medical condition, r/o unspecified dementia, r/o unspecified bereavment    PLAN  1. Psych consult ordered for Dr. Eileen Lynn, [facility]     Note to patient:  The [facility] makes medical notes like these available to patients in the interest of transparency. However, be advised this is a medical document. It is intended as peer to peer communication. It is written in medical language and may contain abbreviations or verbiage that are unfamiliar. It may appear blunt or direct. Medical documents are intended to carry relevant information, facts as evident, and the clinical opinion of the practitioner.

## 2022-06-10 LAB
ANION GAP SERPL CALC-SCNC: 5 MMOL/L (ref 0–18)
BASOPHILS # BLD AUTO: 0.04 X10(3) UL (ref 0–0.2)
BASOPHILS NFR BLD AUTO: 0.4 %
BUN BLD-MCNC: 12 MG/DL (ref 7–18)
BUN/CREAT SERPL: 10.8 (ref 10–20)
CALCIUM BLD-MCNC: 8.3 MG/DL (ref 8.5–10.1)
CHLORIDE SERPL-SCNC: 105 MMOL/L (ref 98–112)
CO2 SERPL-SCNC: 27 MMOL/L (ref 21–32)
CREAT BLD-MCNC: 1.11 MG/DL
DEPRECATED RDW RBC AUTO: 50.1 FL (ref 35.1–46.3)
EOSINOPHIL # BLD AUTO: 0.27 X10(3) UL (ref 0–0.7)
EOSINOPHIL NFR BLD AUTO: 2.6 %
ERYTHROCYTE [DISTWIDTH] IN BLOOD BY AUTOMATED COUNT: 13.9 % (ref 11–15)
GLUCOSE BLD-MCNC: 122 MG/DL (ref 70–99)
GLUCOSE BLDC GLUCOMTR-MCNC: 104 MG/DL (ref 70–99)
GLUCOSE BLDC GLUCOMTR-MCNC: 107 MG/DL (ref 70–99)
GLUCOSE BLDC GLUCOMTR-MCNC: 110 MG/DL (ref 70–99)
GLUCOSE BLDC GLUCOMTR-MCNC: 118 MG/DL (ref 70–99)
GLUCOSE BLDC GLUCOMTR-MCNC: 80 MG/DL (ref 70–99)
HCT VFR BLD AUTO: 31.6 %
HGB BLD-MCNC: 10.2 G/DL
IMM GRANULOCYTES # BLD AUTO: 0.13 X10(3) UL (ref 0–1)
IMM GRANULOCYTES NFR BLD: 1.2 %
LYMPHOCYTES # BLD AUTO: 2 X10(3) UL (ref 1–4)
LYMPHOCYTES NFR BLD AUTO: 19.2 %
MAGNESIUM SERPL-MCNC: 2.2 MG/DL (ref 1.6–2.6)
MCH RBC QN AUTO: 31.4 PG (ref 26–34)
MCHC RBC AUTO-ENTMCNC: 32.3 G/DL (ref 31–37)
MCV RBC AUTO: 97.2 FL
MONOCYTES # BLD AUTO: 0.84 X10(3) UL (ref 0.1–1)
MONOCYTES NFR BLD AUTO: 8.1 %
NEUTROPHILS # BLD AUTO: 7.13 X10 (3) UL (ref 1.5–7.7)
NEUTROPHILS # BLD AUTO: 7.13 X10(3) UL (ref 1.5–7.7)
NEUTROPHILS NFR BLD AUTO: 68.5 %
OSMOLALITY SERPL CALC.SUM OF ELEC: 285 MOSM/KG (ref 275–295)
PHOSPHATE SERPL-MCNC: 2.6 MG/DL (ref 2.5–4.9)
PLATELET # BLD AUTO: 474 10(3)UL (ref 150–450)
POTASSIUM SERPL-SCNC: 3.5 MMOL/L (ref 3.5–5.1)
RBC # BLD AUTO: 3.25 X10(6)UL
SODIUM SERPL-SCNC: 137 MMOL/L (ref 136–145)
WBC # BLD AUTO: 10.4 X10(3) UL (ref 4–11)

## 2022-06-10 PROCEDURE — 99233 SBSQ HOSP IP/OBS HIGH 50: CPT | Performed by: HOSPITALIST

## 2022-06-10 RX ORDER — DEXTROSE AND SODIUM CHLORIDE 5; .9 G/100ML; G/100ML
INJECTION, SOLUTION INTRAVENOUS CONTINUOUS
Status: DISCONTINUED | OUTPATIENT
Start: 2022-06-10 | End: 2022-06-12

## 2022-06-10 RX ORDER — HEPARIN SODIUM 5000 [USP'U]/ML
5000 INJECTION, SOLUTION INTRAVENOUS; SUBCUTANEOUS EVERY 8 HOURS SCHEDULED
Status: DISCONTINUED | OUTPATIENT
Start: 2022-06-10 | End: 2022-06-14

## 2022-06-10 NOTE — PHYSICAL THERAPY NOTE
Chart reviewed for PT treatment, spoke with RN. Patient was seen by psych last night and new medications to help with dementia were provided. Patient is very somnolent and not able to stay awake for a discussion, not safe to be up to chair today. RN requesting we check back tomorrow.  RN aware patient can be up via lift to chair this weekend as he was standing and marching with PT on Wednesday

## 2022-06-10 NOTE — OCCUPATIONAL THERAPY NOTE
RN contacted regarding pt participation in therapy. Per RN pt is very sleepy from meds and not appropriate for oob activities at this time.  Therapy deferred

## 2022-06-10 NOTE — PROGRESS NOTES
06/10/22 1400   Wound 05/19/22 Incision Abdomen Medial;Upper   Date First Assessed/Time First Assessed: 05/19/22 1209   Primary Wound Type: Incision  Location: Abdomen  Wound Location Orientation: Medial;Upper   Site Assessment Clean;Fragile;Granulation tissue; Moist;Red;Painful   Closure Approximated   Drainage Amount Scant   Drainage Description Serosanguineous   Treatments Wound Vac - Neg Pressure   Dressing Wound vac sponge   Dressing Changed Changed   Dressing Status Dressing Changed;Removed; Old drainage   Wound Depth (cm) 1 cm   Non-staged Wound Description Full thickness   Celia-wound Assessment Clean;Dry; Intact   Wound Bed Granulation (%) 100 %   Wound Odor None   Wound Vac Brand KCI   State of Healing Fully granulated   Negative Pressure Wound Therapy Abdomen Mid   Placement Date/Time: 05/23/22 0945   Inserted by: Yecenia Royal RN  Wound Type: Surgical  Location: Abdomen  Wound Location Orientation: Mid   Wound photographed/measured No   Machine Status (On) Yes   Site Assessment Granulation tissue;Fragile;Painful;Moist;Red   Celia-wound Assessment Intact;Dry;Clean   Unit Type KCI ULTA   Dressing Type Black foam   Number of Foam Pieces Used 1   Cycle Continuous; On   Target Pressure (mmHg) 125   Dressing Status Dressing Changed;Removed; Old drainage   Canister Changed No   Wound Follow Up   Follow up needed Yes   pt seen for wound vac change. Wound is almost completely approximated, one area with 1 cm depth.  Next wound vac dressing due on Monday 6/13

## 2022-06-11 LAB
ANION GAP SERPL CALC-SCNC: 5 MMOL/L (ref 0–18)
BASOPHILS # BLD AUTO: 0.05 X10(3) UL (ref 0–0.2)
BASOPHILS NFR BLD AUTO: 0.5 %
BUN BLD-MCNC: 12 MG/DL (ref 7–18)
BUN/CREAT SERPL: 12.9 (ref 10–20)
CALCIUM BLD-MCNC: 8.2 MG/DL (ref 8.5–10.1)
CHLORIDE SERPL-SCNC: 106 MMOL/L (ref 98–112)
CO2 SERPL-SCNC: 28 MMOL/L (ref 21–32)
CREAT BLD-MCNC: 0.93 MG/DL
DEPRECATED RDW RBC AUTO: 50.7 FL (ref 35.1–46.3)
EOSINOPHIL # BLD AUTO: 0.27 X10(3) UL (ref 0–0.7)
EOSINOPHIL NFR BLD AUTO: 2.9 %
ERYTHROCYTE [DISTWIDTH] IN BLOOD BY AUTOMATED COUNT: 13.8 % (ref 11–15)
GLUCOSE BLD-MCNC: 111 MG/DL (ref 70–99)
GLUCOSE BLDC GLUCOMTR-MCNC: 103 MG/DL (ref 70–99)
GLUCOSE BLDC GLUCOMTR-MCNC: 115 MG/DL (ref 70–99)
GLUCOSE BLDC GLUCOMTR-MCNC: 92 MG/DL (ref 70–99)
HCT VFR BLD AUTO: 33 %
HGB BLD-MCNC: 10.3 G/DL
IMM GRANULOCYTES # BLD AUTO: 0.17 X10(3) UL (ref 0–1)
IMM GRANULOCYTES NFR BLD: 1.8 %
LYMPHOCYTES # BLD AUTO: 2.49 X10(3) UL (ref 1–4)
LYMPHOCYTES NFR BLD AUTO: 26.3 %
MAGNESIUM SERPL-MCNC: 2.3 MG/DL (ref 1.6–2.6)
MCH RBC QN AUTO: 31.2 PG (ref 26–34)
MCHC RBC AUTO-ENTMCNC: 31.2 G/DL (ref 31–37)
MCV RBC AUTO: 100 FL
MONOCYTES # BLD AUTO: 0.97 X10(3) UL (ref 0.1–1)
MONOCYTES NFR BLD AUTO: 10.3 %
NEUTROPHILS # BLD AUTO: 5.51 X10 (3) UL (ref 1.5–7.7)
NEUTROPHILS # BLD AUTO: 5.51 X10(3) UL (ref 1.5–7.7)
NEUTROPHILS NFR BLD AUTO: 58.2 %
OSMOLALITY SERPL CALC.SUM OF ELEC: 288 MOSM/KG (ref 275–295)
PHOSPHATE SERPL-MCNC: 2.5 MG/DL (ref 2.5–4.9)
PLATELET # BLD AUTO: 447 10(3)UL (ref 150–450)
POTASSIUM SERPL-SCNC: 3.8 MMOL/L (ref 3.5–5.1)
RBC # BLD AUTO: 3.3 X10(6)UL
SODIUM SERPL-SCNC: 139 MMOL/L (ref 136–145)
WBC # BLD AUTO: 9.5 X10(3) UL (ref 4–11)

## 2022-06-11 PROCEDURE — 99233 SBSQ HOSP IP/OBS HIGH 50: CPT | Performed by: HOSPITALIST

## 2022-06-11 RX ORDER — GABAPENTIN 250 MG/5ML
300 SOLUTION ORAL 2 TIMES DAILY
Status: DISCONTINUED | OUTPATIENT
Start: 2022-06-11 | End: 2022-06-14

## 2022-06-11 NOTE — PLAN OF CARE
Frequent roundings being done. Comfort measures in place. Repositioned as tolerated Q2.      Problem: Safety Risk - Non-Violent Restraints  Goal: Patient will remain free from self-harm  Description: INTERVENTIONS:  - Apply the least restrictive restraint to prevent harm  - Notify patient and family of reasons restraints applied  - Assess for any contributing factors to confusion (electrolyte disturbances, delirium, medications)  - Discontinue any unnecessary medical devices as soon as possible  - Assess the patient's physical comfort, circulation, skin condition, hydration, nutrition and elimination needs   - Reorient and redirection as needed  - Assess for the need to continue restraints  Outcome: Progressing

## 2022-06-11 NOTE — CM/SW NOTE
09: 25AM  SW attempted [employer] via phone to inquire about insurance auth - no answer, left Vmail requesting call back. Vitals and MAR sent to [facility] via [employer]. 01:40PM  Received notice from liaison Boby Lee still pending. Need insurance authorization prior to d/c. PLAN: [facility], Ambulance on WILL CALL, PCS completed (need date) - pending ins auth      SW/CM to remain available for support and/or discharge planning.        Ingris Schulz, MSW, [facility]

## 2022-06-12 LAB
ANION GAP SERPL CALC-SCNC: 3 MMOL/L (ref 0–18)
BASOPHILS # BLD AUTO: 0.04 X10(3) UL (ref 0–0.2)
BASOPHILS NFR BLD AUTO: 0.5 %
BUN BLD-MCNC: 9 MG/DL (ref 7–18)
BUN/CREAT SERPL: 10.7 (ref 10–20)
CALCIUM BLD-MCNC: 8.1 MG/DL (ref 8.5–10.1)
CHLORIDE SERPL-SCNC: 107 MMOL/L (ref 98–112)
CO2 SERPL-SCNC: 30 MMOL/L (ref 21–32)
CREAT BLD-MCNC: 0.84 MG/DL
DEPRECATED RDW RBC AUTO: 48.8 FL (ref 35.1–46.3)
EOSINOPHIL # BLD AUTO: 0.27 X10(3) UL (ref 0–0.7)
EOSINOPHIL NFR BLD AUTO: 3.6 %
ERYTHROCYTE [DISTWIDTH] IN BLOOD BY AUTOMATED COUNT: 13.6 % (ref 11–15)
GLUCOSE BLD-MCNC: 96 MG/DL (ref 70–99)
GLUCOSE BLDC GLUCOMTR-MCNC: 105 MG/DL (ref 70–99)
GLUCOSE BLDC GLUCOMTR-MCNC: 113 MG/DL (ref 70–99)
GLUCOSE BLDC GLUCOMTR-MCNC: 129 MG/DL (ref 70–99)
HCT VFR BLD AUTO: 30.1 %
HGB BLD-MCNC: 9.5 G/DL
IMM GRANULOCYTES # BLD AUTO: 0.08 X10(3) UL (ref 0–1)
IMM GRANULOCYTES NFR BLD: 1.1 %
LYMPHOCYTES # BLD AUTO: 1.74 X10(3) UL (ref 1–4)
LYMPHOCYTES NFR BLD AUTO: 23.3 %
MAGNESIUM SERPL-MCNC: 1.9 MG/DL (ref 1.6–2.6)
MCH RBC QN AUTO: 30.9 PG (ref 26–34)
MCHC RBC AUTO-ENTMCNC: 31.6 G/DL (ref 31–37)
MCV RBC AUTO: 98 FL
MONOCYTES # BLD AUTO: 0.56 X10(3) UL (ref 0.1–1)
MONOCYTES NFR BLD AUTO: 7.5 %
NEUTROPHILS # BLD AUTO: 4.78 X10 (3) UL (ref 1.5–7.7)
NEUTROPHILS # BLD AUTO: 4.78 X10(3) UL (ref 1.5–7.7)
NEUTROPHILS NFR BLD AUTO: 64 %
OSMOLALITY SERPL CALC.SUM OF ELEC: 289 MOSM/KG (ref 275–295)
PHOSPHATE SERPL-MCNC: 1.9 MG/DL (ref 2.5–4.9)
PLATELET # BLD AUTO: 413 10(3)UL (ref 150–450)
POTASSIUM SERPL-SCNC: 3.8 MMOL/L (ref 3.5–5.1)
RBC # BLD AUTO: 3.07 X10(6)UL
SODIUM SERPL-SCNC: 140 MMOL/L (ref 136–145)
WBC # BLD AUTO: 7.5 X10(3) UL (ref 4–11)

## 2022-06-12 PROCEDURE — 99233 SBSQ HOSP IP/OBS HIGH 50: CPT | Performed by: HOSPITALIST

## 2022-06-13 LAB
ANION GAP SERPL CALC-SCNC: 7 MMOL/L (ref 0–18)
BASOPHILS # BLD AUTO: 0.03 X10(3) UL (ref 0–0.2)
BASOPHILS NFR BLD AUTO: 0.4 %
BUN BLD-MCNC: 10 MG/DL (ref 7–18)
BUN/CREAT SERPL: 12.5 (ref 10–20)
CALCIUM BLD-MCNC: 8.7 MG/DL (ref 8.5–10.1)
CHLORIDE SERPL-SCNC: 107 MMOL/L (ref 98–112)
CO2 SERPL-SCNC: 27 MMOL/L (ref 21–32)
CREAT BLD-MCNC: 0.8 MG/DL
DEPRECATED RDW RBC AUTO: 48 FL (ref 35.1–46.3)
EOSINOPHIL # BLD AUTO: 0.28 X10(3) UL (ref 0–0.7)
EOSINOPHIL NFR BLD AUTO: 4.1 %
ERYTHROCYTE [DISTWIDTH] IN BLOOD BY AUTOMATED COUNT: 13.5 % (ref 11–15)
GLUCOSE BLD-MCNC: 111 MG/DL (ref 70–99)
GLUCOSE BLDC GLUCOMTR-MCNC: 107 MG/DL (ref 70–99)
GLUCOSE BLDC GLUCOMTR-MCNC: 112 MG/DL (ref 70–99)
GLUCOSE BLDC GLUCOMTR-MCNC: 75 MG/DL (ref 70–99)
GLUCOSE BLDC GLUCOMTR-MCNC: 89 MG/DL (ref 70–99)
HCT VFR BLD AUTO: 31.3 %
HGB BLD-MCNC: 9.9 G/DL
IMM GRANULOCYTES # BLD AUTO: 0.07 X10(3) UL (ref 0–1)
IMM GRANULOCYTES NFR BLD: 1 %
LYMPHOCYTES # BLD AUTO: 1.91 X10(3) UL (ref 1–4)
LYMPHOCYTES NFR BLD AUTO: 28 %
MAGNESIUM SERPL-MCNC: 1.9 MG/DL (ref 1.6–2.6)
MCH RBC QN AUTO: 31 PG (ref 26–34)
MCHC RBC AUTO-ENTMCNC: 31.6 G/DL (ref 31–37)
MCV RBC AUTO: 98.1 FL
MONOCYTES # BLD AUTO: 0.54 X10(3) UL (ref 0.1–1)
MONOCYTES NFR BLD AUTO: 7.9 %
NEUTROPHILS # BLD AUTO: 4 X10 (3) UL (ref 1.5–7.7)
NEUTROPHILS # BLD AUTO: 4 X10(3) UL (ref 1.5–7.7)
NEUTROPHILS NFR BLD AUTO: 58.6 %
OSMOLALITY SERPL CALC.SUM OF ELEC: 292 MOSM/KG (ref 275–295)
PHOSPHATE SERPL-MCNC: 2.2 MG/DL (ref 2.5–4.9)
PLATELET # BLD AUTO: 434 10(3)UL (ref 150–450)
POTASSIUM SERPL-SCNC: 3.8 MMOL/L (ref 3.5–5.1)
RBC # BLD AUTO: 3.19 X10(6)UL
SODIUM SERPL-SCNC: 141 MMOL/L (ref 136–145)
TRIGL SERPL-MCNC: 87 MG/DL (ref 30–149)
VALPROATE SERPL-MCNC: 11.9 UG/ML (ref 50–100)
WBC # BLD AUTO: 6.8 X10(3) UL (ref 4–11)

## 2022-06-13 PROCEDURE — 99233 SBSQ HOSP IP/OBS HIGH 50: CPT | Performed by: HOSPITALIST

## 2022-06-13 NOTE — CM/SW NOTE
Care Progression Note:  Length of stay: 25  GMLOS: 9.6  Avoidable Delays: Payer Barriers  Code Status: DNAR/Full tx    Acute Medical Issue/Factors:   Weakness generalized     *confused in restraints, unable to dc d/t safety  *Ins auth pending x 5 days, denied LTAC on 6/13  *peer to peer review needed    Discharge Barriers: Physical/emotional and/or cognitive functioning   Expected discharge date: 6/14/2022   Expected next site of care: [address] [place] [address]. / available for discharge planning.      MIRI AwanN    [phone]

## 2022-06-13 NOTE — PHYSICAL THERAPY NOTE
2nd attempt at treatment. Pt is sleeping at this time. Per RN to let pt sleep. Will follow up tomorrow as appropriate.

## 2022-06-13 NOTE — CM/SW NOTE
MARICARMEN received notice from John at [facility] that insurance has intend to deny, requesting a peer to peer. Needs to be scheduled by today 430pm.    MARICARMEN discussed case with attending MD. At this time, patient does not have the option for [facility] due the the need for restraints. [facility] will not accept patients unless restraints are DC'd 24 hours. Attending MD in agreement to peer to peer. SW shared Attending MD contact info and availability for peer to peer. 159pm  Notified by John at [employer] that peer to peer is scheduled for 430pm. MARICARMEN notified attending MD.     PLAN: Awaiting peer to peer for [facility] ([facility]). Patient cannot go to [facility] if on restraints. Updates sent to [place] via [employer]. SW to follow case. Please notify as needs arise.      SARMAD Munoz, [facility]    [id number]

## 2022-06-14 VITALS
DIASTOLIC BLOOD PRESSURE: 59 MMHG | HEART RATE: 70 BPM | HEIGHT: 69 IN | OXYGEN SATURATION: 95 % | WEIGHT: 129.13 LBS | RESPIRATION RATE: 18 BRPM | SYSTOLIC BLOOD PRESSURE: 117 MMHG | BODY MASS INDEX: 19.12 KG/M2 | TEMPERATURE: 100 F

## 2022-06-14 LAB
ALBUMIN SERPL-MCNC: 2 G/DL (ref 3.4–5)
ALBUMIN/GLOB SERPL: 0.5 {RATIO} (ref 1–2)
ALP LIVER SERPL-CCNC: 106 U/L
ALT SERPL-CCNC: 30 U/L
ANION GAP SERPL CALC-SCNC: 5 MMOL/L (ref 0–18)
AST SERPL-CCNC: 25 U/L (ref 15–37)
BASOPHILS # BLD AUTO: 0.04 X10(3) UL (ref 0–0.2)
BASOPHILS NFR BLD AUTO: 0.5 %
BILIRUB SERPL-MCNC: 0.4 MG/DL (ref 0.1–2)
BUN BLD-MCNC: 10 MG/DL (ref 7–18)
BUN/CREAT SERPL: 12 (ref 10–20)
CALCIUM BLD-MCNC: 8.4 MG/DL (ref 8.5–10.1)
CHLORIDE SERPL-SCNC: 106 MMOL/L (ref 98–112)
CO2 SERPL-SCNC: 28 MMOL/L (ref 21–32)
CREAT BLD-MCNC: 0.83 MG/DL
DEPRECATED RDW RBC AUTO: 49.1 FL (ref 35.1–46.3)
EOSINOPHIL # BLD AUTO: 0.22 X10(3) UL (ref 0–0.7)
EOSINOPHIL NFR BLD AUTO: 3 %
ERYTHROCYTE [DISTWIDTH] IN BLOOD BY AUTOMATED COUNT: 13.7 % (ref 11–15)
GLOBULIN PLAS-MCNC: 4.1 G/DL (ref 2.8–4.4)
GLUCOSE BLD-MCNC: 117 MG/DL (ref 70–99)
GLUCOSE BLDC GLUCOMTR-MCNC: 103 MG/DL (ref 70–99)
GLUCOSE BLDC GLUCOMTR-MCNC: 116 MG/DL (ref 70–99)
GLUCOSE BLDC GLUCOMTR-MCNC: 92 MG/DL (ref 70–99)
HCT VFR BLD AUTO: 30.4 %
HGB BLD-MCNC: 9.5 G/DL
IMM GRANULOCYTES # BLD AUTO: 0.08 X10(3) UL (ref 0–1)
IMM GRANULOCYTES NFR BLD: 1.1 %
LYMPHOCYTES # BLD AUTO: 2.69 X10(3) UL (ref 1–4)
LYMPHOCYTES NFR BLD AUTO: 36.3 %
MAGNESIUM SERPL-MCNC: 1.9 MG/DL (ref 1.6–2.6)
MCH RBC QN AUTO: 30.7 PG (ref 26–34)
MCHC RBC AUTO-ENTMCNC: 31.3 G/DL (ref 31–37)
MCV RBC AUTO: 98.4 FL
MONOCYTES # BLD AUTO: 0.57 X10(3) UL (ref 0.1–1)
MONOCYTES NFR BLD AUTO: 7.7 %
NEUTROPHILS # BLD AUTO: 3.82 X10 (3) UL (ref 1.5–7.7)
NEUTROPHILS # BLD AUTO: 3.82 X10(3) UL (ref 1.5–7.7)
NEUTROPHILS NFR BLD AUTO: 51.4 %
OSMOLALITY SERPL CALC.SUM OF ELEC: 288 MOSM/KG (ref 275–295)
PHOSPHATE SERPL-MCNC: 2.5 MG/DL (ref 2.5–4.9)
PLATELET # BLD AUTO: 363 10(3)UL (ref 150–450)
POTASSIUM SERPL-SCNC: 3.9 MMOL/L (ref 3.5–5.1)
PROT SERPL-MCNC: 6.1 G/DL (ref 6.4–8.2)
RBC # BLD AUTO: 3.09 X10(6)UL
SODIUM SERPL-SCNC: 139 MMOL/L (ref 136–145)
VALPROATE SERPL-MCNC: 17.4 UG/ML (ref 50–100)
WBC # BLD AUTO: 7.4 X10(3) UL (ref 4–11)

## 2022-06-14 PROCEDURE — 99239 HOSP IP/OBS DSCHRG MGMT >30: CPT | Performed by: HOSPITALIST

## 2022-06-14 PROCEDURE — 99233 SBSQ HOSP IP/OBS HIGH 50: CPT | Performed by: OTHER

## 2022-06-14 RX ORDER — OLANZAPINE 2.5 MG/1
2.5 TABLET ORAL EVERY EVENING
Status: DISCONTINUED | OUTPATIENT
Start: 2022-06-14 | End: 2022-06-14

## 2022-06-14 RX ORDER — DIVALPROEX SODIUM 125 MG/1
125 CAPSULE, COATED PELLETS ORAL EVERY 8 HOURS SCHEDULED
Status: DISCONTINUED | OUTPATIENT
Start: 2022-06-14 | End: 2022-06-14

## 2022-06-14 RX ORDER — HEPARIN SODIUM 5000 [USP'U]/ML
5000 INJECTION, SOLUTION INTRAVENOUS; SUBCUTANEOUS EVERY 8 HOURS SCHEDULED
Qty: 30 EACH | Refills: 0 | Status: SHIPPED | OUTPATIENT
Start: 2022-06-14

## 2022-06-14 RX ORDER — OLANZAPINE 2.5 MG/1
2.5 TABLET ORAL EVERY EVENING
Qty: 30 TABLET | Refills: 0 | Status: SHIPPED | OUTPATIENT
Start: 2022-06-14

## 2022-06-14 RX ORDER — ACETAMINOPHEN 325 MG/1
650 TABLET ORAL EVERY 6 HOURS PRN
Qty: 1 TABLET | Refills: 0 | Status: SHIPPED | COMMUNITY
Start: 2022-06-14

## 2022-06-14 RX ORDER — ALBUTEROL SULFATE 2.5 MG/3ML
2.5 SOLUTION RESPIRATORY (INHALATION) EVERY 6 HOURS PRN
Qty: 30 EACH | Refills: 0 | Status: SHIPPED | OUTPATIENT
Start: 2022-06-14

## 2022-06-14 RX ORDER — HYDROCODONE BITARTRATE AND ACETAMINOPHEN 5; 325 MG/1; MG/1
1 TABLET ORAL EVERY 6 HOURS PRN
Qty: 20 TABLET | Refills: 0 | Status: SHIPPED | OUTPATIENT
Start: 2022-06-14

## 2022-06-14 RX ORDER — FAMOTIDINE 20 MG/1
20 TABLET, FILM COATED ORAL 2 TIMES DAILY
Qty: 60 TABLET | Refills: 0 | Status: SHIPPED | OUTPATIENT
Start: 2022-06-14

## 2022-06-14 RX ORDER — SODIUM BICARBONATE 325 MG/1
325 TABLET ORAL AS NEEDED
Qty: 20 TABLET | Refills: 0 | Status: SHIPPED | OUTPATIENT
Start: 2022-06-14

## 2022-06-14 NOTE — CM/SW NOTE
Insurance auth approved after peer to peer. MARICARMEN spoke with John from [facility]. Bed should be avail today by 3pm. Will call MARICARMEN to confirm transfer time.   Called and notified SARMAD Khan, [facility]    [phone]

## 2022-06-14 NOTE — CM/SW NOTE
06/14/22 1059   Discharge disposition   Expected discharge disposition [address] Provider [address] [facility]   Discharge transportation [facility]     Pt discussed during nursing rounds. Pt is stable for dc today. MD ward order entered. Pt will dc to [facility] [employer]. Pt's spouse agreeable to dc today. [facility] scheduled for 3pm . Number for nurse report is [phone]. Plan: [facility] [employer] today. / to remain available for support and/or discharge planning.      Marv Herron, [facility]    [phone]

## 2022-06-14 NOTE — DISCHARGE SUMMARY
Dc summary#[id number]  > 30 min spent on [address] Discharge Diagnoses: sbo with necrosis    Lace+ Score: 68  59-90 High Risk  29-58 Medium Risk  0-28   Low Risk. TCM Follow-Up Recommendation:  LACE > 58:  High Risk of readmission after discharge from the hospital.tcm fu recommended

## 2022-06-15 LAB
GLUCOSE BLDC GLUCOMTR-MCNC: 101 MG/DL (ref 70–99)
GLUCOSE BLDC GLUCOMTR-MCNC: 101 MG/DL (ref 70–99)
GLUCOSE BLDC GLUCOMTR-MCNC: 108 MG/DL (ref 70–99)
GLUCOSE BLDC GLUCOMTR-MCNC: 116 MG/DL (ref 70–99)
GLUCOSE BLDC GLUCOMTR-MCNC: 127 MG/DL (ref 70–99)
GLUCOSE BLDC GLUCOMTR-MCNC: 136 MG/DL (ref 70–99)

## 2022-06-15 NOTE — DISCHARGE SUMMARY
[facility]    PATIENT'S NAME: AARTI PFEIFFER   ATTENDING PHYSICIAN: Mya Barrow MD   PATIENT ACCOUNT#:   [id number]    LOCATION:  [address] [place] [address]/[facility] RECORD #:   [id number]       YOB: 1940  ADMISSION DATE:       05/18/2022      DISCHARGE DATE:  06/14/2022    DISCHARGE SUMMARY      One hour was spent coordinating care with Dr. Neftali Reid, Social Work, calling the patient's wife, and preparing the patient's discharge. DISCHARGE DIAGNOSIS:  Small bowel obstruction with necrosis complicated by severe sepsis, respiratory failure, and numerous other issues. HISTORY AND HOSPITAL COURSE:  This is a pleasant  male who was not having bowel movements and was trying to eat and drink, but that amount has been diminishing. He was found to have orange urine. He came to the hospital and was found to be in severe sepsis and appeared to have a bowel obstruction. The patient was admitted for surgery and seen by Cardiology as he was a great risk and also seen by Pulmonary/Critical Care. Dr. Neftali Reid performed surgery and found a necrotic small bowel obstruction with peritonitis. The patient was severely ill for a long period of time and very slowly improved. He had poor nutritional intake. He was fed by G-tube. He was covered with antibiotics with Zosyn course which he completed. He required a rectal tube because of loose stools from tube feedings. He had an abdominal wound related to the surgery. Speech Therapy felt that he could be on pureed nectar thick liquid. The patient was observed further and the diets were modified again and again. Finally the patient was seen by Dr. Veronica Hyde for evaluation for PEG tube because he was not able to take in enough food. He also had Clostridium difficile diarrhea, for which he is being treated with vancomycin. He had a PEG tube placed, then became the problem of being placed adequately to provide his care.   I believe that his wife may also be somewhat demented. There is no other family here. So, we ended up looking for a place in a long-term acute care facility. Insurance was denying the necessity of this. After a [id number] appeal, the doctor agreed when arrangements were made. He was discharged to long term acute care. One of his biggest problems was behavior control. He was not home and this was obviously a different environment for him. So, he had to be in 4-point restraints most times to prevent him from discontinuing his devices. He had an abdominal binder from keeping him pulling out his PEG tube, but that unfortunately created situation of atelectasis. He was discharged to [address]. I tried to call his wife to clarify a few things I have been trying to clarify with his code status, but she did not answer the phone. PHYSICAL EXAMINATION:    VITAL SIGNS:  On discharge temperature 99.5, pulse 70, respiratory rate 18, blood pressure 117/59, 95%. LUNGS:  Rhonchi in both bases. HEART:  Normal S1, S2. No S3.  ABDOMEN:  Soft. The patient screams when I touch him, but I am not sure it is tender. EXTREMITIES:  Without edema. NEUROLOGIC:  He is alert, but oriented only to himself. LABORATORY STUDIES:  Please see chart. ASSESSMENT AND PLAN:    1. Sepsis from intra-abdominal source, severe, related to necrotic small bowel obstruction. Status post surgery. The patient doing well. 2.   Postoperative acute hypoxemic respiratory failure. Extubated on 05/21. 3.   Clostridium difficile diarrhea. Continue vancomycin for 2 more weeks at least.  The patient has rectal tube. Too much diarrhea would affect possible perineal skin health. So, it is being replaced. 4.   Advanced dementia now without acute metabolic encephalopathy. The patient is told he will be able to return home. 5.   Hypertension. 6.   Acute renal failure, resolved. 7.   Hyperlipidemia. 8.   Benign prostatic hypertrophy.   9. Hematuria from the patient tugging at Pearl. Now seems to have resolved. 10.   DVT prophylaxis. Subcutaneous heparin until the patient is ambulatory. CONDITION ON DISCHARGE:  Stable for LTAC. CODE STATUS:  DNAR Full Treatment. The only thing that the patient's family does not want is chest compressions of the ACLS protocol. This needs to be qualified and checked with the family. Again, I tried to discuss it with the wife but to no effect. I cannot discuss it with the patient as he is not able to understand. DISCHARGE MEDICATIONS:    1. Amlodipine 5 mg daily. 2.   Atorvastatin 20 mg nightly. Watch for muscle weakness. 3.   Proscar 5 mg daily. 4.   Gabapentin 300 mg twice a day. 5.   Losartan 50 mg twice a day. 6.   Memantine 5 mg twice a day. 7.   Flomax 0.4 mg twice a day. 8.   B12, 1000 mcg daily. 9.   Tylenol 650 mg every 6 hours as needed for fever. Watch total daily Tylenol limit to 3 g. 10.   Albuterol nebulizers every 6 hours as needed. 11.   Pepcid 20 mg twice a day. 12.   Heparin subcutaneously 5000 units every 8 hours. Stop when ambulatory. 13.   Norco 5/325, 1 tablet every 6 hours as needed for pain. 14.   Zyprexa 2.5 mg every evening. Watch for severe muscle stiffness and fever. 15.   Pancreatolipase particles with bicarbonate 1 capsule per G-tube as needed to unclog G-tube. 16.   Vancomycin 125 mg every 6 hours for 14 more days. ACTIVITY:  PT/OT as tolerated. DIET:  Tube feedings and diet is as here. WOUND CARE:  As here. Will be described by nurse. FOLLOWUP:  Doctor at [facility] for labs and other medical orders. Follow up with Dr. Adriana Vargas as he can. Dr. Adriana Vargas will soon retire, so will follow up with somebody in his group. Follow up with Dr. Corey Kong for wound care and activity orders. Follow up with Psych as an outpatient. Follow up for possible Pearl removal when more active and adjust wound care as needed.     RISK OF READMISSION:  Extremely high.  TCM followup is recommended. Dictated By Yoandy Roman.  MD Pushpa  d: 06/14/2022 18:54:37  t: 06/15/2022 11:13:13  [facility] [id number]  LAS/

## (undated) DEVICE — 6 ML SYRINGE LUER-LOCK TIP: Brand: MONOJECT

## (undated) DEVICE — ABDOMINAL PAD: Brand: CURITY

## (undated) DEVICE — SOLUTION  .9 1000ML BTL

## (undated) DEVICE — SUPER SPONGES,MEDIUM: Brand: KERLIX

## (undated) DEVICE — KIT DRAPE SPY-PHI DRUG

## (undated) DEVICE — SUT PLAIN GUT 2-0 CT 853H

## (undated) DEVICE — ADHESIVE MASTISOL 2/3ML

## (undated) DEVICE — SUT SILK 3-0 SH C013D

## (undated) DEVICE — VIAL LABORATORY SPY

## (undated) DEVICE — SUT SILK 2-0 SH K833H

## (undated) DEVICE — SOLUTION  .9 3000ML

## (undated) DEVICE — CONMED SCOPE SAVER BITE BLOCK, 20X27 MM: Brand: SCOPE SAVER

## (undated) DEVICE — LAP CHOLE: Brand: MEDLINE INDUSTRIES, INC.

## (undated) DEVICE — PROXIMATE RELOADABLE LINEAR STAPLER: Brand: PROXIMATE

## (undated) DEVICE — DEVICE PORT SITE CLOSURE

## (undated) DEVICE — NEEDLE HPO 18GA 1.5IN ECLPS

## (undated) DEVICE — Device: Brand: DUAL NARE NASAL CANNULAE FEMALE LUER CON 7FT O2 TUBE

## (undated) DEVICE — KIT CLEAN ENDOKIT 1.1OZ GOWNX2

## (undated) DEVICE — COVER SGL STRL LGHT HNDL BLU

## (undated) DEVICE — PROXIMATE RELOADABLE LINEAR CUTTER WITH SAFETY LOCK-OUT, 75MM: Brand: PROXIMATE

## (undated) DEVICE — DISPOSABLE SUCTION/IRRIGATOR TUBE SET: Brand: AHTO

## (undated) DEVICE — MEDI-VAC YANKAUER SUCTION HANDLE W/BULBOUS TIP: Brand: CARDINAL HEALTH

## (undated) DEVICE — BANDAGE ROLL,100% COTTON, 6 PLY, LARGE: Brand: KERLIX

## (undated) DEVICE — ENSEAL 20 CM SHAFT, LARGE JAW: Brand: ENSEAL X1

## (undated) DEVICE — MEDI-VAC NON-CONDUCTIVE SUCTION TUBING: Brand: CARDINAL HEALTH

## (undated) DEVICE — KIT PEG GASTRO 20FR

## (undated) DEVICE — MEDI-VAC NON-CONDUCTIVE SUCTION TUBING 6MM X 1.8M (6FT.) L: Brand: CARDINAL HEALTH

## (undated) DEVICE — PROXIMATE LINEAR CUTTER RELOAD, BLUE, 75MM: Brand: PROXIMATE

## (undated) DEVICE — [HIGH FLOW INSUFFLATOR,  DO NOT USE IF PACKAGE IS DAMAGED,  KEEP DRY,  KEEP AWAY FROM SUNLIGHT,  PROTECT FROM HEAT AND RADIOACTIVE SOURCES.]: Brand: PNEUMOSURE

## (undated) DEVICE — GAMMEX® PI HYBRID SIZE 7.5, STERILE POWDER-FREE SURGICAL GLOVE, POLYISOPRENE AND NEOPRENE BLEND: Brand: GAMMEX

## (undated) DEVICE — KIT ENDO ORCAPOD 160/180/190

## (undated) NOTE — LETTER
[address], [place]  Authorization for Invasive Procedures  1. I hereby authorize Dr. Haritha Horton , my physician and whomever may be designated as the doctor's assistant, to perform the following operation and/or procedure:  Ultrasound guided abdominal fluid collection aspiration on Nelly Dunia at [facility].    2. My physician has explained to me the nature and purpose of the operation or other procedure, possible alternative methods of treatment, the risks involved and the possibility of complications to me. I understand the probable consequences of declining the recommended procedure and the alternative methods of treatment. I acknowledge that no guarantee has been made as to the result that may be obtained. 3. I recognize that during the course of this operation or other procedure, unforeseen conditions may necessitate additional or different procedures than those listed above. I, therefore, further authorize and request that the above-named physician, his/her physician assistants, or designees perform such procedures as are, in his/her professional opinion, necessary and desirable. If I have a Do Not Attempt Resuscitation (DNAR) order in place, that status will be suspended while in the operating room, procedural suite, and during the recovery period unless otherwise explicitly stated by me (or a person authorized to consent on my behalf). The surgeon or my attending physician will determine when the applicable recovery period ends for purposes of reinstating the DNAR order. 4. Should the need arise during my operation or immediate post-operative period; I also consent to the administration of blood and/or blood products.  Further, I understand that despite careful testing and screening of blood and blood products, I may still be subject to ill effects as a result of recieving a blood transfusion an/or blood producst. The following are some, but not all, of the potential risks that can occur: fever and allergic reactions, hemolytic reactions, transmission of disease such as hepatitis, AIDS, cytomegalovirus (CMV), and flluid overload. In the event that I wish to have autologous transfusions of my own blood, or a directed donor transfusion, I will discuss this with my physician. 5. I consent to the photographing of the operations or procedures to be performed for the purposes of advancing medicine, science, and/or education, provided my identity is not revealed. If the procedure has been videotaped, the physician/surgeon will obtain the original videotape. The [facility] will not be responsible for storage or maintenance of this tape. 6. I consent to the presence of a  or observer as deemed necessary by my physician or his designee. 7. Any tissues or organs removed in the operation or other procedure may be disposed of by and at the discretion of [facility].    8. I understand that the physician and his/her physician assistants may not be employees or agents of [facility], [facility], nor [facility], but are independent medical practitioners who have been permitted to use its facilities for the care and treatment of their patients. 9. Patients having a sterilization procedure: I understand that if the procedure is successful the results will be permanent and it will therefore be impossible for me to inseminate, conceive or bear children. I also understand that the procedure is intended to result in sterility, although the result has not been guaranteed. 10. I CERTIFY THAT I HAVE READ AND FULLY UNDERSTAND THE ABOVE CONSENT TO OPERATION and/or OTHER PROCEDURE. 11. I acknowledge that my physician has explained sedation/analgesia administration to me including the risks and benefits.  I consent to the administration of sedation/analgesia as may be necessary or desirable in the judgment of my physician. Signature of Patient:  ________________________________________________ Date: _________Time: _________    Responsible person in case of minor or unconscious: _____________________________Relationship: ____________     Witness Signature: ____________________________________________ Date: __________ Time: ___________    Statement of Physician  My signature below affirms that prior to the time of the procedure, I have explained to the patient and/or his legal representative, the risks and benefits involved in the proposed treatment and any reasonable alternative to the proposed treatment. I have also explained the risks and benefits involved in the refusal of the proposed treatment and have answered the patient's questions. If I have a significant financial interest in this procedure/surgery, I have disclosed this and had a discussion with my patient.     Signature of Physician:   ________________________________________Date: _________Time:_______ Patient Name: Nelly Duque  : 1940   Printed: 2022    Medical Record #: [id number]

## (undated) NOTE — LETTER
[address], [place]  Authorization for Invasive Procedures  1. I hereby authorize Dr. Dianne Perez , my physician and whomever may be designated as the doctor's assistant, to perform the following operation and/or procedure:  Esophagogastroduodenoscopy with possible placement of PEG tube, biopsy, control of bleeding on Joel Avendano at [facility].    2. My physician has explained to me the nature and purpose of the operation or other procedure, possible alternative methods of treatment, the risks involved and the possibility of complications to me. I understand the probable consequences of declining the recommended procedure and the alternative methods of treatment. I acknowledge that no guarantee has been made as to the result that may be obtained. 3. I recognize that during the course of this operation or other procedure, unforeseen conditions may necessitate additional or different procedures than those listed above. I, therefore, further authorize and request that the above-named physician, his/her physician assistants, or designees perform such procedures as are, in his/her professional opinion, necessary and desirable. If I have a Do Not Attempt Resuscitation (DNAR) order in place, that status will be suspended while in the operating room, procedural suite, and during the recovery period unless otherwise explicitly stated by me (or a person authorized to consent on my behalf). The surgeon or my attending physician will determine when the applicable recovery period ends for purposes of reinstating the DNAR order. 4. Should the need arise during my operation or immediate post-operative period; I also consent to the administration of blood and/or blood products.  Further, I understand that despite careful testing and screening of blood and blood products, I may still be subject to ill effects as a result of recieving a blood transfusion an/or blood producst. The following are some, but not all, of the potential risks that can occur: fever and allergic reactions, hemolytic reactions, transmission of disease such as hepatitis, AIDS, cytomegalovirus (CMV), and flluid overload. In the event that I wish to have autologous transfusions of my own blood, or a directed donor transfusion, I will discuss this with my physician. 5. I consent to the photographing of the operations or procedures to be performed for the purposes of advancing medicine, science, and/or education, provided my identity is not revealed. If the procedure has been videotaped, the physician/surgeon will obtain the original videotape. The \[facility]\"" will not be responsible for storage or maintenance of this tape. 6. I consent to the presence of a  or observer as deemed necessary by my physician or his designee. 7. Any tissues or organs removed in the operation or other procedure may be disposed of by and at the discretion of [facility].    8. I understand that the physician and his/her physician assistants may not be employees or agents of [facility], [facility], nor [facility], but are independent medical practitioners who have been permitted to use its facilities for the care and treatment of their patients. 9. Patients having a sterilization procedure: I understand that if the procedure is successful the results will be permanent and it will therefore be impossible for me to inseminate, conceive or bear children. I also understand that the procedure is intended to result in sterility, although the result has not been guaranteed. 10. I CERTIFY THAT I HAVE READ AND FULLY UNDERSTAND THE ABOVE CONSENT TO OPERATION and/or OTHER PROCEDURE. 11. I acknowledge that my physician has explained sedation/analgesia administration to me including the risks and benefits.  I consent to the administration of sedation/analgesia as may be necessary or desirable in the judgment of my physician. Signature of Patient:  ________________________________________________ Date: _________Time: _________    Responsible person in case of minor or unconscious: _____________________________Relationship: ____________     Witness Signature: ____________________________________________ Date: __________ Time: ___________    Statement of Physician  My signature below affirms that prior to the time of the procedure, I have explained to the patient and/or his legal representative, the risks and benefits involved in the proposed treatment and any reasonable alternative to the proposed treatment. I have also explained the risks and benefits involved in the refusal of the proposed treatment and have answered the patient's questions. If I have a significant financial interest in this procedure/surgery, I have disclosed this and had a discussion with my patient.     Signature of Physician:   ________________________________________Date: _________Time:_______ Patient Name: Avelina Del Angel  : 1940   Printed: 2022    Medical Record #: [id number]

## (undated) NOTE — LETTER
[address] [place] [address]      Authorization for Surgical Operation and Procedure     Date:___________                                                                                                         Time:__________  1. I hereby Mathews Sandifer, MD, my physician and his/her assistants (if applicable), which may include medical students, residents, and/or fellows, to perform the following surgical operation/ procedure and administer such anesthesia as may be determined necessary by my physician:  Operation/Procedure name (s) PERCUTANEOUS ENDOSCOPIC GASTROSTOMY PLACEMENT/JEJUNOSTOMY TUBE PLACEMENT  [place] [address]   2. I recognize that during the surgical operation/procedure, unforeseen conditions may necessitate additional or different procedures than those listed above. I, therefore, further authorize and request that the above-named surgeon, assistants, or designees perform such procedures as are, in their judgment, necessary and desirable. 3.   My surgeon/physician has discussed prior to my surgery the potential benefits, risks and side effects of this procedure; the likelihood of achieving goals; and potential problems that might occur during recuperation. They also discussed reasonable alternatives to the procedure, including risks, benefits, and side effects related to the alternatives and risks related to not receiving this procedure. I have had all my questions answered and I acknowledge that no guarantee has been made as to the result that may be obtained. 4.   Should the need arise during my operation or immediate post-operative period, I also consent to the administration of blood and/or blood products. Further, I understand that despite careful testing and screening of blood or blood products by collecting agencies, I may still be subject to ill effects as a result of receiving a blood transfusion and/or blood products.   The following are some, but not all, of the potential risks that can occur: fever and allergic reactions, hemolytic reactions, transmission of diseases such as Hepatitis, AIDS and Cytomegalovirus (CMV) and fluid overload. In the event that I wish to have an autologous transfusion of my own blood, or a directed donor transfusion. I will discuss this with my physician. 5.   I authorize the use of any specimen, organs, tissues, body parts or foreign objects that may be removed from my body during the operation/procedure for diagnosis, research or teaching purposes and their subsequent disposal by hospital authorities. I also authorize the release of specimen test results and/or written reports to my treating physician on the hospital medical staff or other referring or consulting physicians involved in my care, at the discretion of the Pathologist or my treating physician. 6.   I consent to the photographing or videotaping of the operations or procedures to be performed, including appropriate portions of my body for medical, scientific, or educational purposes, provided my identity is not revealed by the pictures or by descriptive texts accompanying them. If the procedure has been photographed/videotaped, the surgeon will obtain the original picture, image, videotape or CD. The hospital will not be responsible for storage, release or maintenance of the picture, image, tape or CD.    7.   I consent to the presence of a  or observers in the operating room as deemed necessary by my physician or their designees. 8.   I recognize that in the event my procedure results in extended X-Ray/fluoroscopy time, I may develop a skin reaction. 9. If I have a Do Not Attempt Resuscitation (DNAR) order in place, that status will be suspended while in the operating room, procedural suite, and during the recovery period unless otherwise explicitly stated by me (or a person authorized to consent on my behalf).  The surgeon or my attending physician will determine when the applicable recovery period ends for purposes of reinstating the DNAR order. 10. Patients having a sterilization procedure: I understand that if the procedure is successful the results will be permanent and it will therefore be impossible for me to inseminate, conceive, or bear children. I also understand that the procedure is intended to result in sterility, although the result has not been guaranteed. 11. I acknowledge that my physician has explained sedation/analgesia administration to me including the risk and benefits I consent to the administration of sedation/analgesia as may be necessary or desirable in the judgment of my physician. I CERTIFY THAT I HAVE READ AND FULLY UNDERSTAND THE ABOVE CONSENT TO OPERATION and/or OTHER PROCEDURE.    _________________________________________  __________________________________  Signature of Patient     Signature of Responsible Person         ___________________________________         Printed Name of Responsible Person           _________________________________                  Relationship to Patient  _________________________________________  ______________________________  Signature of Witness          Date  Time    STATEMENT OF PHYSICIAN My signature below affirms that prior to the time of the procedure; I have explained to the patient and/or his/her legal representative, the risks and benefits involved in the proposed treatment and any reasonable alternative to the proposed treatment. I have also explained the risks and benefits involved in refusal of the proposed treatment and alternatives to the proposed treatment and have answered the patient's questions.  If I have a significant financial interest in a co-management agreement or a significant financial interest in any product or implant, or other significant relationship used in this procedure/surgery, I have disclosed this and had a discussion with my patient.     _______________________________________________________________ _____________________________  Misty Mena Physician)                                                                                         (Date)                                   (Time)        Patient Name: Cammy Salas    : 1940   Printed: 2022      Medical Record #: [id number]                                              Page 1 of 1

## (undated) NOTE — LETTER
[address], [place]  Authorization for Invasive Procedures  1. I hereby authorize Dr. Jerry Auguste , my physician and whomever may be designated as the doctor's assistant, to perform the following operation and/or procedure:  Exploratory laparotomy with possible bowel resection possible colostomy on Lorraine Kendall at [facility].    2. My physician has explained to me the nature and purpose of the operation or other procedure, possible alternative methods of treatment, the risks involved and the possibility of complications to me. I understand the probable consequences of declining the recommended procedure and the alternative methods of treatment. I acknowledge that no guarantee has been made as to the result that may be obtained. 3. I recognize that during the course of this operation or other procedure, unforeseen conditions may necessitate additional or different procedures than those listed above. I, therefore, further authorize and request that the above-named physician, his/her physician assistants, or designees perform such procedures as are, in his/her professional opinion, necessary and desirable. If I have a Do Not Attempt Resuscitation (DNAR) order in place, that status will be suspended while in the operating room, procedural suite, and during the recovery period unless otherwise explicitly stated by me (or a person authorized to consent on my behalf). The surgeon or my attending physician will determine when the applicable recovery period ends for purposes of reinstating the DNAR order. 4. Should the need arise during my operation or immediate post-operative period; I also consent to the administration of blood and/or blood products.  Further, I understand that despite careful testing and screening of blood and blood products, I may still be subject to ill effects as a result of recieving a blood transfusion an/or blood producst. The following are some, but not all, of the potential risks that can occur: fever and allergic reactions, hemolytic reactions, transmission of disease such as hepatitis, AIDS, cytomegalovirus (CMV), and flluid overload. In the event that I wish to have autologous transfusions of my own blood, or a directed donor transfusion, I will discuss this with my physician. 5. I consent to the photographing of the operations or procedures to be performed for the purposes of advancing medicine, science, and/or education, provided my identity is not revealed. If the procedure has been videotaped, the physician/surgeon will obtain the original videotape. The [facility] will not be responsible for storage or maintenance of this tape. 6. I consent to the presence of a  or observer as deemed necessary by my physician or his designee. 7. Any tissues or organs removed in the operation or other procedure may be disposed of by and at the discretion of [facility].    8. I understand that the physician and his/her physician assistants may not be employees or agents of [facility], [facility], nor [facility], but are independent medical practitioners who have been permitted to use its facilities for the care and treatment of their patients. 9. Patients having a sterilization procedure: I understand that if the procedure is successful the results will be permanent and it will therefore be impossible for me to inseminate, conceive or bear children. I also understand that the procedure is intended to result in sterility, although the result has not been guaranteed. 10. I CERTIFY THAT I HAVE READ AND FULLY UNDERSTAND THE ABOVE CONSENT TO OPERATION and/or OTHER PROCEDURE. 11. I acknowledge that my physician has explained sedation/analgesia administration to me including the risks and benefits.  I consent to the administration of sedation/analgesia as may be necessary or desirable in the judgment of my physician. Signature of Patient:  ________________________________________________ Date: _________Time: _________    Responsible person in case of minor or unconscious: _____________________________Relationship: ____________     Witness Signature: ____________________________________________ Date: __________ Time: ___________    Statement of Physician  My signature below affirms that prior to the time of the procedure, I have explained to the patient and/or his legal representative, the risks and benefits involved in the proposed treatment and any reasonable alternative to the proposed treatment. I have also explained the risks and benefits involved in the refusal of the proposed treatment and have answered the patient's questions. If I have a significant financial interest in this procedure/surgery, I have disclosed this and had a discussion with my patient.     Signature of Physician:   ________________________________________Date: _________Time:_______ Patient Name: Yolanda Thomas  : 1940   Printed: May 19, 2022    Medical Record #: [id number]

## (undated) NOTE — LETTER
Hospital Discharge Documentation  Please phone to schedule a hospital follow up appointment.     From: [address] [facility]  Phone: [phone]    Patient discharged time/date: 3/2/2020  4:00 PM  Patient discharge disposition:  Home or Self his wife at his grandsons track meet. His wife noted that he was shaking his leg she looked at him and noted that his eyes were rolled back and he was unresponsive for less than a minute.   There was a nurse in the scans, CPR was started AED was placed on Take 5 mg by mouth 2 (two) times daily. Refills:  0     tamsulosin HCl 0.4 MG Caps  Commonly known as:  FLOMAX      Take 0.4 mg by mouth 2 (two) times daily.    Refills:  0     Vitamin B-12 1000 MCG Tabs  Commonly known as:  VITAMIN B12      Take 1,000 m

## (undated) NOTE — LETTER
[address]      Authorization for Surgical Operation and Procedure     Date:___________                                                                                                         Time:__________  1. I hereby authorize Andria Bruno MD, my physician and his/her assistants (if applicable), which may include medical students, residents, and/or fellows, to perform the following surgical operation/ procedure and administer such anesthesia as may be determined necessary by my physician:  Operation/Procedure name (s) [address] [facility] [employer] [facility]  [facility] [place] [address]   2. I recognize that during the surgical operation/procedure, unforeseen conditions may necessitate additional or different procedures than those listed above. I, therefore, further authorize and request that the above-named surgeon, assistants, or designees perform such procedures as are, in their judgment, necessary and desirable. 3.   My surgeon/physician has discussed prior to my surgery the potential benefits, risks and side effects of this procedure; the likelihood of achieving goals; and potential problems that might occur during recuperation. They also discussed reasonable alternatives to the procedure, including risks, benefits, and side effects related to the alternatives and risks related to not receiving this procedure. I have had all my questions answered and I acknowledge that no guarantee has been made as to the result that may be obtained. 4.   Should the need arise during my operation or immediate post-operative period, I also consent to the administration of blood and/or blood products. Further, I understand that despite careful testing and screening of blood or blood products by collecting agencies, I may still be subject to ill effects as a result of receiving a blood transfusion and/or blood products.   The following are some, but not all, of the potential risks that can occur: fever and allergic reactions, hemolytic reactions, transmission of diseases such as Hepatitis, AIDS and Cytomegalovirus (CMV) and fluid overload. In the event that I wish to have an autologous transfusion of my own blood, or a directed donor transfusion. I will discuss this with my physician. 5.   I authorize the use of any specimen, organs, tissues, body parts or foreign objects that may be removed from my body during the operation/procedure for diagnosis, research or teaching purposes and their subsequent disposal by hospital authorities. I also authorize the release of specimen test results and/or written reports to my treating physician on the hospital medical staff or other referring or consulting physicians involved in my care, at the discretion of the Pathologist or my treating physician. 6.   I consent to the photographing or videotaping of the operations or procedures to be performed, including appropriate portions of my body for medical, scientific, or educational purposes, provided my identity is not revealed by the pictures or by descriptive texts accompanying them. If the procedure has been photographed/videotaped, the surgeon will obtain the original picture, image, videotape or CD. The hospital will not be responsible for storage, release or maintenance of the picture, image, tape or CD.    7.   I consent to the presence of a  or observers in the operating room as deemed necessary by my physician or their designees. 8.   I recognize that in the event my procedure results in extended X-Ray/fluoroscopy time, I may develop a skin reaction. 9. If I have a Do Not Attempt Resuscitation (DNAR) order in place, that status will be suspended while in the operating room, procedural suite, and during the recovery period unless otherwise explicitly stated by me (or a person authorized to consent on my behalf).  The surgeon or my attending physician will determine when the applicable recovery period ends for purposes of reinstating the DNAR order. 10. Patients having a sterilization procedure: I understand that if the procedure is successful the results will be permanent and it will therefore be impossible for me to inseminate, conceive, or bear children. I also understand that the procedure is intended to result in sterility, although the result has not been guaranteed. 11. I acknowledge that my physician has explained sedation/analgesia administration to me including the risk and benefits I consent to the administration of sedation/analgesia as may be necessary or desirable in the judgment of my physician. I CERTIFY THAT I HAVE READ AND FULLY UNDERSTAND THE ABOVE CONSENT TO OPERATION and/or OTHER PROCEDURE.    _________________________________________  __________________________________  Signature of Patient     Signature of Responsible Person         ___________________________________         Printed Name of Responsible Person           _________________________________                  Relationship to Patient  _________________________________________  ______________________________  Signature of Witness          Date  Time    STATEMENT OF PHYSICIAN My signature below affirms that prior to the time of the procedure; I have explained to the patient and/or his/her legal representative, the risks and benefits involved in the proposed treatment and any reasonable alternative to the proposed treatment. I have also explained the risks and benefits involved in refusal of the proposed treatment and alternatives to the proposed treatment and have answered the patient's questions.  If I have a significant financial interest in a co-management agreement or a significant financial interest in any product or implant, or other significant relationship used in this procedure/surgery, I have disclosed this and had a discussion with my patient.     _______________________________________________________________ _____________________________  Ben Orf of Physician)                                                                                         (Date)                                   (Time)        Patient Name: Vishal Ortiz    : 1940   Printed: 2022      Medical Record #: [id number]                                              Page 1 of 1

## (undated) NOTE — LETTER
[address], [place]  Authorization for Invasive Procedures  1. I hereby authorize Dr. Spike Amador, my physician and whomever may be designated as the doctor's assistant, to perform the following operation and/or procedure:  Esophagogastroduodenoscopy with possible percutaneous endoscopic gastrostomy tube placement / jejunostomy tube placement on Nova Gustafson at [facility].    2. My physician has explained to me the nature and purpose of the operation or other procedure, possible alternative methods of treatment, the risks involved and the possibility of complications to me. I understand the probable consequences of declining the recommended procedure and the alternative methods of treatment. I acknowledge that no guarantee has been made as to the result that may be obtained. 3. I recognize that during the course of this operation or other procedure, unforeseen conditions may necessitate additional or different procedures than those listed above. I, therefore, further authorize and request that the above-named physician, his/her physician assistants, or designees perform such procedures as are, in his/her professional opinion, necessary and desirable. If I have a Do Not Attempt Resuscitation (DNAR) order in place, that status will be suspended while in the operating room, procedural suite, and during the recovery period unless otherwise explicitly stated by me (or a person authorized to consent on my behalf). The surgeon or my attending physician will determine when the applicable recovery period ends for purposes of reinstating the DNAR order. 4. Should the need arise during my operation or immediate post-operative period; I also consent to the administration of blood and/or blood products.  Further, I understand that despite careful testing and screening of blood and blood products, I may still be subject to ill effects as a result of recieving a blood transfusion an/or blood producst. The following are some, but not all, of the potential risks that can occur: fever and allergic reactions, hemolytic reactions, transmission of disease such as hepatitis, AIDS, cytomegalovirus (CMV), and flluid overload. In the event that I wish to have autologous transfusions of my own blood, or a directed donor transfusion, I will discuss this with my physician. 5. I consent to the photographing of the operations or procedures to be performed for the purposes of advancing medicine, science, and/or education, provided my identity is not revealed. If the procedure has been videotaped, the physician/surgeon will obtain the original videotape. [facility] will not be responsible for storage or maintenance of this tape. 6. I consent to the presence of a  or observer as deemed necessary by my physician or his designee. 7. Any tissues or organs removed in the operation or other procedure may be disposed of by and at the discretion of [facility].    8. I understand that the physician and his/her physician assistants may not be employees or agents of [facility], [facility], nor [address], but are independent medical practitioners who have been permitted to use its facilities for the care and treatment of their patients. 9. Patients having a sterilization procedure: I understand that if the procedure is successful the results will be permanent and it will therefore be impossible for me to inseminate, conceive or bear children. I also understand that the procedure is intended to result in sterility, although the result has not been guaranteed. 10. I CERTIFY THAT I HAVE READ AND FULLY UNDERSTAND THE ABOVE CONSENT TO OPERATION and/or OTHER PROCEDURE. 11. I acknowledge that my physician has explained sedation/analgesia administration to me including the risks and benefits.  I consent to the administration of sedation/analgesia as may be necessary or desirable in the judgment of my physician. Signature of Patient:  ________________________________________________ Date: _________Time: _________    Responsible person in case of minor or unconscious: _____________________________Relationship: ____________     Witness Signature: ____________________________________________ Date: __________ Time: ___________    Statement of Physician  My signature below affirms that prior to the time of the procedure, I have explained to the patient and/or his legal representative, the risks and benefits involved in the proposed treatment and any reasonable alternative to the proposed treatment. I have also explained the risks and benefits involved in the refusal of the proposed treatment and have answered the patient's questions. If I have a significant financial interest in this procedure/surgery, I have disclosed this and had a discussion with my patient.     Signature of Physician:   ________________________________________Date: _________Time:_______ Patient Name: Joel Avendano  : 1940   Printed: 2022    Medical Record #: [id number]

## (undated) NOTE — LETTER
[facility]  Administration of Anesthesia  1. Chay Sue, or Barber Rocha, acting on his behalf, (Patient) (Dependent/Representative) request to receive anesthesia for my pending procedure/operation/treatment. A physician (anesthesiologist) alone or an anesthesiologist working with a nurse anesthetist may administer my anesthesia. 2. I understand that my anesthesiologist is not an employee or agent of the hospital, but is an independent medical practitioner who has been permitted to use its facilities for the care and treatment of his/her patients. 3. I acknowledge that a [facility] from [facility] or their designate(s), recommended anesthesia for me using her/his medical judgment. The type(s) of anesthesia I may receive include:                a) General Anesthesia, b) Spinal/Epidural Anesthesia, c) Regional Anesthesia or d) Monitored Anesthesia Care. 4. If my spinal, regional or monitored anesthesia care (local) is not satisfactory for my comfort, or if my medical condition requires, I consent to the administration of general anesthesia. 5. I am aware that the practice of anesthesiology is not an exact science and that some foreseeable risks or consequences may occur. Some common risks/consequences include sore throat and hoarseness, nausea and vomiting, muscle soreness, backache, damage to the mouth/teeth/vocal cords and eye injury. I understand that more rare but serious potential risks of anesthesia include blood pressure changes, drug reactions, cardiac arrest, brain damage, paralysis or death. These risks apply to whether I have general, spinal/epidural, regional or monitored anesthesia care. 6. OBSTETRIC PATIENTS: Specific risks/consequences of spinal/epidural anesthesia may include itching, low blood pressure, difficulty urinating, slowing of the baby's heart rate and headache.  Rare risks include infections, high spinal block, spinal bleeding, seizure, cardiac arrest and death. 7. AWARENESS: I understand that it is possible (but unlikely) to have explicit memory of events from the operating room while under general anesthesia. 8. ELECTROCONVULSIVE THERAPY PATIENTS: This consent serves for all treatments in a single course of therapy. 9. I understand that I must inform my anesthesiologist when I last ate and/or drank to minimize the risk of anesthesia. 10. If I am pregnant, or may pregnant, I understand that elective surgery should be postponed until after the baby is born. Anesthetics cross the placenta and may temporarily anesthetize the baby. Although fetal complications of anesthesia during pregnancy are rare, they may include birth defects, premature labor, brain damage and death. 11. I certify that I informed the anesthesiologist, to the best of my ability, about medication I take including blood thinners, anticoagulants, herbal remedies, narcotics and recreational drugs (e.g. cocaine, marijuana, PCP). Failure to inform my anesthesiologist about these medicines may increase my risk of anesthetic complications. The nature and purpose of my anesthetic management was explained to me. I had the opportunity to ask questions and the answers and information provided meet my satisfaction.   I retain the right to withdraw this consent at any time prior to the administration of said anesthetic.    ___________________________________________________           _____________________________________________________  Patient Signature                                                                                      Witness Signature                ___________________________________________________           _____________________________________________________  Date/Time                                                                                               Responsible person in case of minor/ unconscious pt /Relationship    My signature below affirms that prior to the time of the procedure, I have explained to the patient and/or his/her guardian, the risks and benefits of undergoing anesthesia, as well as any reasonable alternatives.     ___________________________________________________            _____________________________________________________  Physician Signature                            Date/Time  Patient Name: Joel Avendano     : 1940     Printed: 2022      Medical Record #: [id number]                              Page 1 of 1    ----------ANESTHESIA CONSENT----------